# Patient Record
Sex: FEMALE | Race: WHITE | Employment: OTHER | URBAN - METROPOLITAN AREA
[De-identification: names, ages, dates, MRNs, and addresses within clinical notes are randomized per-mention and may not be internally consistent; named-entity substitution may affect disease eponyms.]

---

## 2017-07-21 ENCOUNTER — OFFICE VISIT (OUTPATIENT)
Dept: OBGYN CLINIC | Age: 82
End: 2017-07-21

## 2017-07-21 VITALS
HEIGHT: 64 IN | RESPIRATION RATE: 18 BRPM | BODY MASS INDEX: 31.51 KG/M2 | SYSTOLIC BLOOD PRESSURE: 110 MMHG | DIASTOLIC BLOOD PRESSURE: 68 MMHG | WEIGHT: 184.6 LBS

## 2017-07-21 DIAGNOSIS — N64.4 BREAST PAIN: Primary | ICD-10-CM

## 2017-07-21 DIAGNOSIS — Z87.898: ICD-10-CM

## 2017-07-21 DIAGNOSIS — R93.89 ABNORMAL CT OF THE CHEST: ICD-10-CM

## 2017-07-21 NOTE — MR AVS SNAPSHOT
Visit Information Date & Time Provider Department Dept. Phone Encounter #  
 7/21/2017 10:50 AM Lori Beckwith MD [facility] [phone] [id number] Upcoming Health Maintenance Date Due ZOSTER VACCINE AGE 60> 2/20/1997 OSTEOPOROSIS SCREENING (DEXA) 4/20/2002 INFLUENZA AGE 9 TO ADULT 8/1/2017 Allergies as of 7/21/2017  Review Complete On: 7/21/2017 By: Albino Ramírez Severity Noted Reaction Type Reactions Codeine  04/07/2011    Itching [address] [Nitrofurantoin Monohyd/m-cryst]  04/07/2011    Other (comments) Muscle weakness Pcn [Penicillins]  04/07/2011    Rash Itching, facial swelling, pounding heart Current Immunizations  Never Reviewed No immunizations on file. Not reviewed this visit Vitals BP Resp Height(growth percentile) Weight(growth percentile) BMI OB Status 110/68 (BP 1 Location: Left arm, BP Patient Position: Sitting) 18 5' 4\" (1.626 m) 184 lb 9.6 oz (83.7 kg) 31.69 kg/m2 Hysterectomy Smoking Status Former Smoker Vitals History BMI and BSA Data Body Mass Index Body Surface Area  
 31.69 kg/m 2 1.94 m 2 Preferred Pharmacy Pharmacy Name Phone [facility] [address], [address] [phone] Your Updated Medication List  
  
   
This list is accurate as of: 7/21/17 11:19 AM.  Always use your most recent med list.  
  
  
  
  
 ASPIRIN PO Take 81 mg by mouth daily. BUMEX PO Take  by mouth. Usually takes one mg po once a week. BYSTOLIC 5 mg tablet Generic drug:  nebivolol Take 5 mg by mouth nightly. CO Q-10 200 mg capsule Generic drug:  coenzyme Q-10 Take 200 mg by mouth daily. doxepin 150 mg capsule Commonly known as:  SINEquan Take 150 mg by mouth nightly. doxycycline 100 mg capsule Commonly known as:  Geraline Block Take  by mouth. Will take 4 caps one hour prior to colonoscopy and 4 caps one hour prior to colonoscopy on 10/27/2016. LORazepam 0.5 mg tablet Commonly known as:  ATIVAN Take 0.5 mg by mouth as needed for Anxiety. meloxicam 15 mg tablet Commonly known as:  MOBIC Take 15 mg by mouth daily. MULTIVITAMIN PO Take  by mouth daily. Takes one po daily. omeprazole 20 mg capsule Commonly known as:  PRILOSEC Take 20 mg by mouth as needed. PROBIOTIC PO Take  by mouth. Takes on po one daily. PROzac 40 mg capsule Generic drug:  FLUoxetine Take 40 mg by mouth daily. SYMBICORT 160-4.5 mcg/actuation HFA inhaler Generic drug:  budesonide-formoterol Take 2 Puffs by inhalation as needed. VITAMIN D3 PO Take 2,000 mg by mouth daily. Patient Instructions Breast Lumps: Care Instructions Your Care Instructions Breast lumps are common, especially in women between ages 27 and 48. Many womens breasts feel lumpy and tender before their menstrual period. Women also may have lumps when they are breastfeeding. Breast lumps may go away after menopause. All new breast lumps in women after menopause should be checked by a doctor. Although lumps may be normal for you, it is important to have your doctor check any lump or thickness that is not like the rest of your breast to make sure it is not cancer. A lump may be larger, harder, or different from the rest of your breast tissue. Follow-up care is a key part of your treatment and safety. Be sure to make and go to all appointments, and call your doctor if you are having problems. Its also a good idea to know your test results and keep a list of the medicines you take. How can you care for yourself at home? · Make an appointment to have a mammogram and other follow-up visits as recommended by your doctor. When should you call for help? Call your doctor now or seek immediate medical care if: · You have new changes in a breast, such as: ¨ A lump or thickening in your breast or armpit. ¨ A change in the breast's size or shape. ¨ Skin changes, such as dimples or puckers. ¨ Nipple discharge. ¨ A change in the color or feel of the skin of your breast or the darker area around the nipple (areola). ¨ A change in the shape of the nipple (it may look like it's being pulled into the breast). · You have symptoms of a breast infection, such as: 
¨ Increased pain, swelling, redness, or warmth around a breast. 
¨ Red streaks extending from the breast. 
¨ Pus draining from a breast. 
¨ A fever. Watch closely for changes in your health, and be sure to contact your doctor if: 
· You do not get better as expected. Where can you learn more? Go to http://herminio-los.info/. Enter G192 in the search box to learn more about \"Breast Lumps: Care Instructions. \" Current as of: October 13, 2016 Content Version: 11.3 © 1480-7408 [employer]. Care instructions adapted under license by [employer] (which disclaims liability or warranty for this information). If you have questions about a medical condition or this instruction, always ask your healthcare professional. [address] any warranty or liability for your use of this information. Introducing [facility]! Dear Ramya Brice: Thank you for requesting a Vecast account. Our records indicate that you already have an active Vecast account. You can access your account anytime at [email or web address] Did you know that you can access your hospital and ER discharge instructions at any time in Vecast? You can also review all of your test results from your hospital stay or ER visit. Additional Information If you have questions, please visit the Frequently Asked Questions section of the Vecast website at [email or web address] Remember, [employer] Systemshart is NOT to be used for urgent needs. For medical emergencies, dial 911. Now available from your iPhone and Android! Please provide this summary of care documentation to your next provider. Your primary care clinician is listed as Earl Prieto. If you have any questions after today's visit, please call [phone].

## 2017-07-21 NOTE — PATIENT INSTRUCTIONS
Breast Lumps: Care Instructions  Your Care Instructions  Breast lumps are common, especially in women between ages 27 and 48. Many womens breasts feel lumpy and tender before their menstrual period. Women also may have lumps when they are breastfeeding. Breast lumps may go away after menopause. All new breast lumps in women after menopause should be checked by a doctor. Although lumps may be normal for you, it is important to have your doctor check any lump or thickness that is not like the rest of your breast to make sure it is not cancer. A lump may be larger, harder, or different from the rest of your breast tissue. Follow-up care is a key part of your treatment and safety. Be sure to make and go to all appointments, and call your doctor if you are having problems. Its also a good idea to know your test results and keep a list of the medicines you take. How can you care for yourself at home? · Make an appointment to have a mammogram and other follow-up visits as recommended by your doctor. When should you call for help? Call your doctor now or seek immediate medical care if:  · You have new changes in a breast, such as:  ¨ A lump or thickening in your breast or armpit. ¨ A change in the breast's size or shape. ¨ Skin changes, such as dimples or puckers. ¨ Nipple discharge. ¨ A change in the color or feel of the skin of your breast or the darker area around the nipple (areola). ¨ A change in the shape of the nipple (it may look like it's being pulled into the breast). · You have symptoms of a breast infection, such as:  ¨ Increased pain, swelling, redness, or warmth around a breast.  ¨ Red streaks extending from the breast.  ¨ Pus draining from a breast.  ¨ A fever. Watch closely for changes in your health, and be sure to contact your doctor if:  · You do not get better as expected. Where can you learn more? Go to http://herminio-los.info/.   Enter V198 in the search box to learn more about \"Breast Lumps: Care Instructions. \"  Current as of: October 13, 2016  Content Version: 11.3  © 1883-9242 [employer] [place]. Care instructions adapted under license by [employer] (which disclaims liability or warranty for this information). If you have questions about a medical condition or this instruction, always ask your healthcare professional. [address] any warranty or liability for your use of this information.

## 2017-07-21 NOTE — PROGRESS NOTES
[address] [facility]  [email or web address]  [phone]    Kevin Ferrer MD, FACOG       OB/GYN Problem visit    Chief Complaint:   Chief Complaint   Patient presents with    Nodule     left breast       History of Present Illness: This is a new problem being evaluated by this provider. The patient is a [de-identified] y.o.  female who reports last week she went to the hospital because earlier that morning she had spit up some blood. Explains that the day before, she had yellow/brown tinged mucus. So she and her partner went to the hospital, and that's when she had an X-ray. She then found out she had a nodule on her thyroid gland and her left breast as well as her stomach. Denies breast pain and/or nipple discharge at this time. No aggravating or alleviating factors noted at this time. She reports the symptoms are is unchanged. Aggravating factors include none. Alleviating factors include none. She does not have other concerns. LMP: No LMP recorded. Patient has had a hysterectomy.     PFSH:  Past Medical History:   Diagnosis Date    Anxiety     Aortic valve disorders 2011    Arthritis     Asthma     off and on per pt    Chronic pain     both legs/had 2 ESIs in back    Coronary atherosclerosis of native coronary artery 2011    pt unsure of this    GERD (gastroesophageal reflux disease)     hiatal hernia    Heart failure ([address]     CHF    Hypertension     Left heart failure ([address] 2011    Other chest pain 2011    Other ill-defined conditions     fibromyalgia    Other ill-defined conditions     back pain    Unspecified sleep apnea     does not wear CPAP - minor     Past Surgical History:   Procedure Laterality Date    CARDIAC SURG PROCEDURE UNLIST      aortic valve replacement - cow valve    COLONOSCOPY N/A 10/27/2016    COLONOSCOPY performed by [facility]renice Ferrell MD at [facility] ENDOSCOPY    HX APPENDECTOMY      done with cholecystectomy    HX GI cholecystectomy    HX GYN      hysterectomy (left ovaries), menorrhagia    HX HEENT      tonsillectomy x 1 as of 10/26/2016    HX ORTHOPAEDIC      bilateral partial knee replacements    HX ORTHOPAEDIC      bilateral trigger finger release    HX OTHER SURGICAL      EGD/colonoscopy x 3 - colon polyp    HX TONSILLECTOMY      HX WISDOM TEETH EXTRACTION       Family History   Problem Relation Age of Onset    Colon Cancer Mother     Heart Failure Mother     Diabetes Father     Heart Disease Sister      pumping at 10%    Other Sister      aneurysm in heart    Diabetes Sister     Diabetes Daughter      Social History   Substance Use Topics    Smoking status: Former Smoker     Quit date: 8/15/1984    Smokeless tobacco: Never Used      Comment: former cigarette smoker    Alcohol use No     Allergies   Allergen Reactions    Codeine Itching    Macrobid [Nitrofurantoin Monohyd/M-Cryst] Other (comments)     Muscle weakness    Pcn [Penicillins] Rash     Itching, facial swelling, pounding heart     Current Outpatient Prescriptions   Medication Sig    coenzyme Q-10 (CO Q-10) 200 mg capsule Take 200 mg by mouth daily.  nebivolol (BYSTOLIC) 5 mg tablet Take 5 mg by mouth nightly.  ASPIRIN PO Take 81 mg by mouth daily.  LACTOBACILLUS RHAMNOSUS GG (PROBIOTIC PO) Take  by mouth. Takes on po one daily.  CHOLECALCIFEROL, VITAMIN D3, (VITAMIN D3 PO) Take 2,000 mg by mouth daily.  doxepin (SINEQUAN) 150 mg capsule Take 150 mg by mouth nightly.  MULTIVITAMIN PO Take  by mouth daily. Takes one po daily.  fluoxetine (PROZAC) 40 mg capsule Take 40 mg by mouth daily.  LORazepam (ATIVAN) 0.5 mg tablet Take 0.5 mg by mouth as needed for Anxiety.  doxycycline (MONODOX) 100 mg capsule Take  by mouth. Will take 4 caps one hour prior to colonoscopy and 4 caps one hour prior to colonoscopy on 10/27/2016.     budesonide-formoterol (SYMBICORT) 160-4.5 mcg/actuation HFA inhaler Take 2 Puffs by inhalation as needed.  meloxicam (MOBIC) 15 mg tablet Take 15 mg by mouth daily.  omeprazole (PRILOSEC) 20 mg capsule Take 20 mg by mouth as needed.  BUMETANIDE (BUMEX PO) Take  by mouth. Usually takes one mg po once a week. No current facility-administered medications for this visit. Review of Systems:  History obtained from the patient  Constitutional: negative for fevers, chills and weight loss  ENT ROS: negative for - hearing change, oral lesions or visual changes  Respiratory: negative for cough, wheezing or dyspnea on exertion  Cardiovascular: negative for chest pain, irregular heart beats, exertional chest pressure/discomfort  Gastrointestinal: negative for dysphagia, nausea and vomiting  Genito-Urinary ROS:  see HPI  Inteument/breast: negative for rash, breast lump and nipple discharge  Musculoskeletal:negative for stiff joints, neck pain and muscle weakness  Endocrine ROS: negative for - breast changes, galactorrhea or temperature intolerance  Hematological and Lymphatic ROS: negative for - blood clots, bruising or swollen lymph nodes    Physical Exam:  Visit Vitals    /68 (BP 1 Location: Left arm, BP Patient Position: Sitting)    Resp 18    Ht 5' 4\" (1.626 m)    Wt 184 lb 9.6 oz (83.7 kg)    BMI 31.69 kg/m2       GENERAL: alert, well appearing, and in no distress  HEAD: normocephalic, atraumatic. PULM: clear to auscultation, no wheezes, rales or rhonchi, symmetric air entry   Breast exam: breasts appear normal, no suspicious masses, no skin or nipple changes or axillary nodes. COR: normal rate and regular rhythm, S1 and S2 normal   ABDOMEN: soft, nontender, nondistended, no masses or organomegaly     NEURO: alert, oriented, normal speech    Assessment:  Encounter Diagnoses   Name Primary?     Breast pain Yes    Hx of lymphadenopathy     Abnormal CT of the chest     Comment: showed mass/LN subpectoral       Plan:  The patient is advised that she should contact the office if she does not note improvement or if symptoms recur  Recommend follow up with PCP for non-gynecologic complaints and chronic medical problems. She should contact our office with any questions or concerns  She could keep her routine annual exam appointment. Disc causes of breast pain and LN  Rec breast referral  Disc SBE  Reviewed outside CT: Left ax LAD and subpectoral LN 1.5 cm on left  Reviewed prior gyn US and CT: suspect ov cyst: needs fu  Rec keep endo fu for thyroid nodule: has appt  GYN US and fu planned: pt will make today, had to r/s past appt. US Results (most recent):    Results from [place] encounter on 02/04/16   US TRANSVAGINAL   Narrative **Final Report**      ICD Codes / Adm. Diagnosis: 625.9  R10.2 / Unspecified symptom associated    Pelvic and perineal pain  Examination:  US TRANSVAGINAL NON OB  - [id number] - Feb 4 2016  3:00PM  Accession No:  [id number]  Reason:  pain      REPORT:  See impression. IMPRESSION:      The final result for this exam can be located in this patient's chart with   results from [facility]. Signing/Reading Doctor: Jessica Pascual ([id number])    Matthew Weeks  ([id number])  Feb 4 2016  3:01PM                                  CT Results (most recent):    Results from Hospital Encounter encounter on 02/17/16   CT ABD PELV WO CONT   Narrative **Final Report**      ICD Codes / Adm. Diagnosis: 625.9  789.09 / Unspecified symptom associated    Abdominal pain, other specif  Examination:  CT ABD PELVIS WO CON  - [id number] - Feb 17 2016  1:22PM  Accession No:  [id number]  Reason:  pelvic pain, abd pain, pelvic mass/cyst      REPORT:  EXAM: CT ABDOMEN AND PELVIS WITHOUT CONTRAST    INDICATION:  Abdominal and pelvic pain with pelvic mass or cyst.    COMPARISON: None. CONTRAST: None. TECHNIQUE:   Unenhanced multislice helical CT was performed from the diaphragm to the   symphysis pubis without or venous contrast administration.  Oral contrast was given.  Contiguous 5 mm axial images were reconstructed and lung and soft   tissue windows were generated. Coronal and sagittal reformations were   generated. CT dose reduction was achieved through use of a standardized   protocol tailored for this examination and automatic exposure control for   dose modulation. FINDINGS:  The patient is morbidly obese. The patient is status post median sternotomy   and aortic valve replacement. LOWER CHEST: The visualized portions of the lung bases are clear. The absence of intravenous contrast material reduces the sensitivity for   evaluation of the solid parenchymal organs of the abdomen. ABDOMEN:  Liver: The liver is normal in size and contour with a small low-attenuation   lesion that is too small to characterize. Gallbladder and bile ducts: The gallbladder is absent. There is no   intrahepatic biliary duct dilatation. Spleen: No abnormality. Pancreas: No abnormality. Adrenal glands: No abnormality. Kidneys: No focal parenchymal abnormality. There is no renal or ureteral   calculus or obstruction. PELVIS:  Reproductive organs: The uterus is absent. There is a small 2.7 x 2.6 x 3.1   cm cyst in the pelvic cul-de-sac which may be ovarian related. The ovaries   are not clearly identified. Bladder: No abnormality. RETROPERITONEUM: The aorta is atherosclerotic and tapers without aneurysm. There is no retroperitoneal adenopathy or mass. There is no pelvic mass or   adenopathy    BOWEL AND MESENTERY: The small bowel is normal. The appendix is absent. There are diverticula of the sigmoid colon without evidence of acute   diverticulitis. PERITONEUM: There is no ascites or free intraperitoneal air. BONES AND SOFT TISSUES: There is degenerative disc disease of the lumbar   spine. IMPRESSION:   1. No acute abdominal or pelvic abnormality. 2. Status post hysterectomy.   3. Small pelvic cul-de-sac cyst possibly ovarian. No pelvic mass or   adenopathy. 4. Status post median sternotomy and aortic valve replacement. 5. Atherosclerotic abdominal aorta without aneurysm. 6. Status post cholecystectomy. 7. Status post appendectomy. 8. Diverticulosis. 9. Lumbar spondylosis. Signing/Reading Doctor: Jasmyne Sanchez ([id number])    Approved: Jasmyne Sanchez ([id number])  Feb 17 2016  1:36PM                                     Orders Placed This Encounter    REFERRAL TO BREAST SURGERY       No results found for this visit on 07/21/17.

## 2017-07-28 DIAGNOSIS — N63.0 BREAST MASS: Primary | ICD-10-CM

## 2017-08-01 ENCOUNTER — HOSPITAL ENCOUNTER (OUTPATIENT)
Dept: MAMMOGRAPHY | Age: 82
Discharge: HOME | End: 2017-08-01
Attending: SURGERY
Payer: MEDICARE

## 2017-08-01 DIAGNOSIS — Z12.31 ENCOUNTER FOR SCREENING MAMMOGRAM FOR BREAST CANCER: ICD-10-CM

## 2017-08-01 DIAGNOSIS — N63.0 BREAST MASS: ICD-10-CM

## 2017-08-01 PROCEDURE — 77067 SCR MAMMO BI INCL CAD: CPT

## 2017-08-09 ENCOUNTER — OFFICE VISIT (OUTPATIENT)
Dept: OBGYN CLINIC | Age: 82
End: 2017-08-09

## 2017-08-09 VITALS
BODY MASS INDEX: 31.62 KG/M2 | SYSTOLIC BLOOD PRESSURE: 118 MMHG | HEIGHT: 64 IN | WEIGHT: 185.2 LBS | RESPIRATION RATE: 18 BRPM | DIASTOLIC BLOOD PRESSURE: 64 MMHG

## 2017-08-09 DIAGNOSIS — N94.9 ADNEXAL CYST: Primary | ICD-10-CM

## 2017-08-09 RX ORDER — CLINDAMYCIN HYDROCHLORIDE 300 MG/1
CAPSULE ORAL
COMMUNITY
Start: 2017-08-02 | End: 2018-03-26

## 2017-08-09 RX ORDER — SPIRONOLACTONE 25 MG/1
25 TABLET ORAL DAILY
COMMUNITY
Start: 2017-08-02

## 2017-08-09 NOTE — MR AVS SNAPSHOT
Visit Information Date & Time Provider Department Dept. Phone Encounter #  
 8/9/2017  1:50 PM Howard Peguero MD Jesus Morales [phone] [id number] Upcoming Health Maintenance Date Due ZOSTER VACCINE AGE 60> 2/20/1997 OSTEOPOROSIS SCREENING (DEXA) 4/20/2002 INFLUENZA AGE 9 TO ADULT 8/1/2017 Allergies as of 8/9/2017  Review Complete On: 8/9/2017 By: Pool Lim Severity Noted Reaction Type Reactions Codeine  04/07/2011    Itching Ratna Lacer [Nitrofurantoin Monohyd/m-cryst]  04/07/2011    Other (comments) Muscle weakness Pcn [Penicillins]  04/07/2011    Rash Itching, facial swelling, pounding heart Current Immunizations  Never Reviewed No immunizations on file. Not reviewed this visit Vitals BP Resp Height(growth percentile) Weight(growth percentile) BMI OB Status 118/64 (BP 1 Location: Left arm, BP Patient Position: Sitting) 18 5' 4\" (1.626 m) 185 lb 3.2 oz (84 kg) 31.79 kg/m2 Hysterectomy Smoking Status Former Smoker BMI and BSA Data Body Mass Index Body Surface Area 31.79 kg/m 2 1.95 m 2 Preferred Pharmacy Pharmacy Name Phone [address] [facility] [address] [place] [address] Drive, [address] [facility] [address] [phone] Your Updated Medication List  
  
   
This list is accurate as of: 8/9/17  1:58 PM.  Always use your most recent med list.  
  
  
  
  
 ASPIRIN PO Take 81 mg by mouth daily. BUMEX PO Take  by mouth. Usually takes one mg po once a week. BYSTOLIC 5 mg tablet Generic drug:  nebivolol Take 5 mg by mouth nightly. clindamycin 300 mg capsule Commonly known as:  CLEOCIN  
  
 CO Q-10 200 mg capsule Generic drug:  coenzyme Q-10 Take 200 mg by mouth daily. doxepin 150 mg capsule Commonly known as:  SINEquan Take 150 mg by mouth nightly. doxycycline 100 mg capsule Commonly known as:  Jimi Reyes Take  by mouth. Will take 4 caps one hour prior to colonoscopy and 4 caps one hour prior to colonoscopy on 10/27/2016. LORazepam 0.5 mg tablet Commonly known as:  ATIVAN Take 0.5 mg by mouth as needed for Anxiety. meloxicam 15 mg tablet Commonly known as:  MOBIC Take 15 mg by mouth daily. MULTIVITAMIN PO Take  by mouth daily. Takes one po daily. omeprazole 20 mg capsule Commonly known as:  PRILOSEC Take 20 mg by mouth as needed. PROBIOTIC PO Take  by mouth. Takes on po one daily. PROzac 40 mg capsule Generic drug:  FLUoxetine Take 40 mg by mouth daily. spironolactone 25 mg tablet Commonly known as:  ALDACTONE  
  
 SYMBICORT 160-4.5 mcg/actuation HFA inhaler Generic drug:  budesonide-formoterol Take 2 Puffs by inhalation as needed. VITAMIN D3 PO Take 2,000 mg by mouth daily. Patient Instructions Breast Self-Exam: Care Instructions Your Care Instructions A breast self-exam is when you check your breasts for lumps or changes. This regular exam helps you learn how your breasts normally look and feel. Most breast problems or changes are not because of cancer. Breast self-exam is not a substitute for a mammogram. Having regular breast exams by your doctor and regular mammograms improve your chances of finding any problems with your breasts. Some women set a time each month to do a step-by-step breast self-exam. Other women like a less formal system. They might look at their breasts as they brush their teeth, or feel their breasts once in a while in the shower. If you notice a change in your breast, tell your doctor. Follow-up care is a key part of your treatment and safety. Be sure to make and go to all appointments, and call your doctor if you are having problems. Its also a good idea to know your test results and keep a list of the medicines you take.  
How do you do a breast self-exam? 
 · The best time to examine your breasts is usually one week after your menstrual period begins. Your breasts should not be tender then. If you do not have periods, you might do your exam on a day of the month that is easy to remember. · To examine your breasts: ¨ Remove all your clothes above the waist and lie down. When you are lying down, your breast tissue spreads evenly over your chest wall, which makes it easier to feel all your breast tissue. ¨ Use the padsnot the fingertipsof the 3 middle fingers of your left hand to check your right breast. Move your fingers slowly in small coin-sized circles that overlap. ¨ Use three levels of pressure to feel of all your breast tissue. Use light pressure to feel the tissue close to the skin surface. Use medium pressure to feel a little deeper. Use firm pressure to feel your tissue close to your breastbone and ribs. Use each pressure level to feel your breast tissue before moving on to the next spot. ¨ Check your entire breast, moving up and down as if following a strip from the collarbone to the bra line, and from the armpit to the ribs. Repeat until you have covered the entire breast. 
¨ Repeat this procedure for your left breast, using the pads of the 3 middle fingers of your right hand. · To examine your breasts while in the shower: 
¨ Place one arm over your head and lightly soap your breast on that side. ¨ Using the pads of your fingers, gently move your hand over your breast (in the strip pattern described above), feeling carefully for any lumps or changes. ¨ Repeat for the other breast. 
· Have your doctor inspect anything you notice to see if you need further testing. Where can you learn more? Go to http://herminio-los.info/. Enter P148 in the search box to learn more about \"Breast Self-Exam: Care Instructions. \" Current as of: July 26, 2016 Content Version: 11.3 © 6361-8188 Healthwise, Incorporated. Care instructions adapted under license by test company (which disclaims liability or warranty for this information). If you have questions about a medical condition or this instruction, always ask your healthcare professional. [address] any warranty or liability for your use of this information. Introducing [facility]! Dear Chelsea Fontanez: Thank you for requesting a Cardley account. Our records indicate that you already have an active Cardley account. You can access your account anytime at [email or web address] Siving Egil [email or web address] Did you know that you can access your hospital and ER discharge instructions at any time in Cardley? You can also review all of your test results from your hospital stay or ER visit. Additional Information If you have questions, please visit the Frequently Asked Questions section of the Cardley website at [email or web address] Remember, Cardley is NOT to be used for urgent needs. For medical emergencies, dial 911. Now available from your iPhone and Android! Please provide this summary of care documentation to your next provider. Your primary care clinician is listed as Earl Prieto. If you have any questions after today's visit, please call [phone].

## 2017-08-09 NOTE — PROGRESS NOTES
[facility]  [email or web address]    Sp Tovar MD, [address] [facility]       OB/GYN Follow-up visit    Chief Complaint: Follow up visit  Chief Complaint   Patient presents with    Ultrasound     Ovarian cyst       History of Present Illness: This is a follow up visit from 07/21/2017. She is having a follow up for ultrasound to check ovarian cyst.  She had cyst of ? Origin on an CT. The patient denies any new problems or changes at this time. She reports the symptoms are has improved. Aggravating factors include none. Alleviating factors include none. She does not have other concerns. LMP: No LMP recorded. Patient has had a hysterectomy.     PFSH:  Past Medical History:   Diagnosis Date    Anxiety     Aortic valve disorders 4/7/2011    Arthritis     Asthma     off and on per pt    Chronic pain     both legs/had 2 ESIs in back    Coronary atherosclerosis of native coronary artery 4/7/2011    pt unsure of this    GERD (gastroesophageal reflux disease)     hiatal hernia    Heart failure ([address]     CHF    Hypertension     Left heart failure ([address] 4/7/2011    Other chest pain 4/7/2011    Other ill-defined conditions     fibromyalgia    Other ill-defined conditions     back pain    Unspecified sleep apnea     does not wear CPAP - minor     Past Surgical History:   Procedure Laterality Date    CARDIAC SURG PROCEDURE UNLIST  2013    aortic valve replacement - cow valve    COLONOSCOPY N/A 10/27/2016    COLONOS[facility] performed by Peace Tafoya [facility] at [facility] END[facility]    HX APPENDECTOMY      done with cholecystectomy    HX GI      cholecystectomy    HX GYN      hysterectomy (left ovaries), menorrhagia    HX HEENT      tonsillectomy x 1 as of 10/26/2016    HX ORTHOPAEDIC      bilateral partial knee replacements    HX ORTHOPAEDIC      bilateral trigger finger release    HX OTHER SURGICAL      EGD/colonoscopy x 3 - colon polyp    HX TONSILLECTOMY      HX WISDOM TEETH EXTRACTION      IMPLANT BREAST SILICONE/EQ Bilateral     Removed are 3 different sets. Family History   Problem Relation Age of Onset    Colon Cancer Mother     Heart Failure Mother     Diabetes Father     Heart Disease Sister      pumping at 10%    Other Sister      aneurysm in heart    Diabetes Sister     Diabetes Daughter      Social History   Substance Use Topics    Smoking status: Former Smoker     Quit date: 8/15/1984    Smokeless tobacco: Never Used      Comment: former cigarette smoker    Alcohol use No     Allergies   Allergen Reactions    Codeine Itching    Macrobid [Nitrofurantoin Monohyd/M-Cryst] Other (comments)     Muscle weakness    Pcn [Penicillins] Rash     Itching, facial swelling, pounding heart     Current Outpatient Prescriptions   Medication Sig    clindamycin (CLEOCIN) 300 mg capsule     spironolactone (ALDACTONE) 25 mg tablet     coenzyme Q-10 (CO Q-10) 200 mg capsule Take 200 mg by mouth daily.  nebivolol (BYSTOLIC) 5 mg tablet Take 5 mg by mouth nightly.  ASPIRIN PO Take 81 mg by mouth daily.  meloxicam (MOBIC) 15 mg tablet Take 15 mg by mouth daily.  LACTOBACILLUS RHAMNOSUS GG (PROBIOTIC PO) Take  by mouth. Takes on po one daily.  CHOLECALCIFEROL, VITAMIN D3, (VITAMIN D3 PO) Take 2,000 mg by mouth daily.  doxepin (SINEQUAN) 150 mg capsule Take 150 mg by mouth nightly.  MULTIVITAMIN PO Take  by mouth daily. Takes one po daily.  fluoxetine (PROZAC) 40 mg capsule Take 40 mg by mouth daily.  omeprazole (PRILOSEC) 20 mg capsule Take 20 mg by mouth as needed. No current facility-administered medications for this visit.         Review of Systems:  History obtained from the patient  Constitutional: negative for fevers, chills and weight loss  ENT ROS: negative for - hearing change, oral lesions or visual changes  Respiratory: negative for cough, wheezing or dyspnea on exertion  Cardiovascular: negative for chest pain, irregular heart beats, exertional chest pressure/discomfort  Gastrointestinal: negative for dysphagia, nausea and vomiting  Genito-Urinary ROS: no dysuria, trouble voiding, or hematuria  Inteument/breast: negative for rash, breast lump and nipple discharge  Musculoskeletal:negative for stiff joints, neck pain and muscle weakness  Endocrine ROS: negative for - breast changes, galactorrhea or temperature intolerance  Hematological and Lymphatic ROS: negative for - blood clots, bruising or swollen lymph nodes    Physical Exam:  Visit Vitals    /64 (BP 1 Location: Left arm, BP Patient Position: Sitting)    Resp 18    Ht 5' 4\" (1.626 m)    Wt 185 lb 3.2 oz (84 kg)    BMI 31.79 kg/m2       GENERAL: alert, well appearing, and in no distress  Skin exam - normal coloration and turgor, no rashes, no suspicious skin lesions noted. Pt declined pelvic  NEURO: alert, oriented, normal speech    Assessment:  Encounter Diagnosis   Name Primary?  Adnexal cyst Yes       Plan:  The patient is advised that she should contact the office with any questions or concerns. She should make her routine annual gynecologic appointment if needed. Disc possible causes of pelvic cysts: peritoneal, fluid build up, resolved cyst  Suspect benign/resolved  Reviewed s/sx ov cancer: fu if has any questions or concerns. Ct reviewed 2/17/06  PELVIS:  Reproductive organs: The uterus is absent. There is a small 2.7 x 2.6 x 3.1   cm cyst in the pelvic cul-de-sac which may be ovarian related. The ovaries   are not clearly identified. No orders of the defined types were placed in this encounter. No results found for this visit on 08/09/17. Marita Habermann, MD    UTERUS IS SURGICALLY ABSENT. RIGHT ADNEXA APPEARS WITHIN NORMAL LIMITS. LEFT ADNEXA APPEARS WITHIN NORMAL LIMITS. NO MASSES OR CYSTS ARE SEEN TODAY. NO FREE FLUID SEEN IN THE CDS.     Physician review of ultrasound performed by technician    Today's ultrasound report and images were reviewed and discussed with the patient.   Please see images and imaging report entered by technician in PACS for more detail and progress note and diagnosis entered by MD.    Alley Tracey MD

## 2017-08-09 NOTE — PATIENT INSTRUCTIONS
Breast Self-Exam: Care Instructions  Your Care Instructions  A breast self-exam is when you check your breasts for lumps or changes. This regular exam helps you learn how your breasts normally look and feel. Most breast problems or changes are not because of cancer. Breast self-exam is not a substitute for a mammogram. Having regular breast exams by your doctor and regular mammograms improve your chances of finding any problems with your breasts. Some women set a time each month to do a step-by-step breast self-exam. Other women like a less formal system. They might look at their breasts as they brush their teeth, or feel their breasts once in a while in the shower. If you notice a change in your breast, tell your doctor. Follow-up care is a key part of your treatment and safety. Be sure to make and go to all appointments, and call your doctor if you are having problems. Its also a good idea to know your test results and keep a list of the medicines you take. How do you do a breast self-exam?  · The best time to examine your breasts is usually one week after your menstrual period begins. Your breasts should not be tender then. If you do not have periods, you might do your exam on a day of the month that is easy to remember. · To examine your breasts:  ¨ Remove all your clothes above the waist and lie down. When you are lying down, your breast tissue spreads evenly over your chest wall, which makes it easier to feel all your breast tissue. ¨ Use the pads--not the fingertips--of the 3 middle fingers of your left hand to check your right breast. Move your fingers slowly in small coin-sized circles that overlap. ¨ Use three levels of pressure to feel of all your breast tissue. Use light pressure to feel the tissue close to the skin surface. Use medium pressure to feel a little deeper. Use firm pressure to feel your tissue close to your breastbone and ribs.  Use each pressure level to feel your breast tissue before moving on to the next spot. ¨ Check your entire breast, moving up and down as if following a strip from the collarbone to the bra line, and from the armpit to the ribs. Repeat until you have covered the entire breast.  ¨ Repeat this procedure for your left breast, using the pads of the 3 middle fingers of your right hand. · To examine your breasts while in the shower:  ¨ Place one arm over your head and lightly soap your breast on that side. ¨ Using the pads of your fingers, gently move your hand over your breast (in the strip pattern described above), feeling carefully for any lumps or changes. ¨ Repeat for the other breast.  · Have your doctor inspect anything you notice to see if you need further testing. Where can you learn more? Go to http://herminio-los.info/. Enter P148 in the search box to learn more about \"Breast Self-Exam: Care Instructions. \"  Current as of: July 26, 2016  Content Version: 11.3  © 1771-5471 [employer], Incorporated. Care instructions adapted under license by [employer] (which disclaims liability or warranty for this information). If you have questions about a medical condition or this instruction, always ask your healthcare professional. [place] [address] any warranty or liability for your use of this information.

## 2018-03-26 RX ORDER — CLONAZEPAM 0.5 MG/1
0.5 TABLET ORAL
COMMUNITY
End: 2020-05-23

## 2018-03-26 RX ORDER — GABAPENTIN 100 MG/1
100 CAPSULE ORAL 2 TIMES DAILY
COMMUNITY
End: 2020-02-13

## 2018-03-26 RX ORDER — OMEPRAZOLE 40 MG/1
40 CAPSULE, DELAYED RELEASE ORAL
COMMUNITY
End: 2020-02-13

## 2018-03-26 RX ORDER — GLIPIZIDE 5 MG/1
5 TABLET ORAL DAILY
COMMUNITY

## 2018-03-27 ENCOUNTER — HOSPITAL ENCOUNTER (OUTPATIENT)
Age: 83
Setting detail: OUTPATIENT SURGERY
Discharge: HOME | End: 2018-03-27
Attending: INTERNAL MEDICINE | Admitting: INTERNAL MEDICINE
Payer: MEDICARE

## 2018-03-27 ENCOUNTER — ANESTHESIA (OUTPATIENT)
Dept: ENDOSCOPY | Age: 83
End: 2018-03-27
Payer: MEDICARE

## 2018-03-27 ENCOUNTER — ANESTHESIA EVENT (OUTPATIENT)
Dept: ENDOSCOPY | Age: 83
End: 2018-03-27
Payer: MEDICARE

## 2018-03-27 VITALS
RESPIRATION RATE: 28 BRPM | WEIGHT: 185 LBS | TEMPERATURE: 97.8 F | OXYGEN SATURATION: 97 % | SYSTOLIC BLOOD PRESSURE: 120 MMHG | BODY MASS INDEX: 31.76 KG/M2 | DIASTOLIC BLOOD PRESSURE: 71 MMHG | HEART RATE: 70 BPM

## 2018-03-27 LAB
H PYLORI FROM TISSUE: NEGATIVE
KIT LOT NO., HCLOLOT: NORMAL
NEGATIVE CONTROL: NEGATIVE
POSITIVE CONTROL: POSITIVE

## 2018-03-27 PROCEDURE — 76060000031 HC ANESTHESIA FIRST 0.5 HR: Performed by: INTERNAL MEDICINE

## 2018-03-27 PROCEDURE — 77030009426 HC FCPS BIOP ENDOSC BSC -B: Performed by: INTERNAL MEDICINE

## 2018-03-27 PROCEDURE — 87077 CULTURE AEROBIC IDENTIFY: CPT | Performed by: INTERNAL MEDICINE

## 2018-03-27 PROCEDURE — 76040000019: Performed by: INTERNAL MEDICINE

## 2018-03-27 PROCEDURE — 74011000250 HC RX REV CODE- 250

## 2018-03-27 PROCEDURE — 74011250636 HC RX REV CODE- 250/636

## 2018-03-27 RX ORDER — FENTANYL CITRATE 50 UG/ML
200 INJECTION, SOLUTION INTRAMUSCULAR; INTRAVENOUS
Status: DISCONTINUED | OUTPATIENT
Start: 2018-03-27 | End: 2018-03-27 | Stop reason: HOSPADM

## 2018-03-27 RX ORDER — SODIUM CHLORIDE 9 MG/ML
INJECTION, SOLUTION INTRAVENOUS
Status: DISCONTINUED | OUTPATIENT
Start: 2018-03-27 | End: 2018-03-27 | Stop reason: HOSPADM

## 2018-03-27 RX ORDER — EPINEPHRINE 0.1 MG/ML
1 INJECTION INTRACARDIAC; INTRAVENOUS
Status: DISCONTINUED | OUTPATIENT
Start: 2018-03-27 | End: 2018-03-27 | Stop reason: HOSPADM

## 2018-03-27 RX ORDER — NALOXONE HYDROCHLORIDE 0.4 MG/ML
0.4 INJECTION, SOLUTION INTRAMUSCULAR; INTRAVENOUS; SUBCUTANEOUS
Status: DISCONTINUED | OUTPATIENT
Start: 2018-03-27 | End: 2018-03-27 | Stop reason: HOSPADM

## 2018-03-27 RX ORDER — PROPOFOL 10 MG/ML
INJECTION, EMULSION INTRAVENOUS AS NEEDED
Status: DISCONTINUED | OUTPATIENT
Start: 2018-03-27 | End: 2018-03-27 | Stop reason: HOSPADM

## 2018-03-27 RX ORDER — LIDOCAINE HYDROCHLORIDE 20 MG/ML
INJECTION, SOLUTION EPIDURAL; INFILTRATION; INTRACAUDAL; PERINEURAL AS NEEDED
Status: DISCONTINUED | OUTPATIENT
Start: 2018-03-27 | End: 2018-03-27 | Stop reason: HOSPADM

## 2018-03-27 RX ORDER — SODIUM CHLORIDE 0.9 % (FLUSH) 0.9 %
5-10 SYRINGE (ML) INJECTION AS NEEDED
Status: DISCONTINUED | OUTPATIENT
Start: 2018-03-27 | End: 2018-03-27 | Stop reason: HOSPADM

## 2018-03-27 RX ORDER — DEXTROMETHORPHAN/PSEUDOEPHED 2.5-7.5/.8
1.2 DROPS ORAL
Status: DISCONTINUED | OUTPATIENT
Start: 2018-03-27 | End: 2018-03-27 | Stop reason: HOSPADM

## 2018-03-27 RX ORDER — SODIUM CHLORIDE 9 MG/ML
50 INJECTION, SOLUTION INTRAVENOUS CONTINUOUS
Status: DISCONTINUED | OUTPATIENT
Start: 2018-03-27 | End: 2018-03-27 | Stop reason: HOSPADM

## 2018-03-27 RX ORDER — GLYCOPYRROLATE 0.2 MG/ML
INJECTION INTRAMUSCULAR; INTRAVENOUS AS NEEDED
Status: DISCONTINUED | OUTPATIENT
Start: 2018-03-27 | End: 2018-03-27 | Stop reason: HOSPADM

## 2018-03-27 RX ORDER — FLUMAZENIL 0.1 MG/ML
0.2 INJECTION INTRAVENOUS
Status: DISCONTINUED | OUTPATIENT
Start: 2018-03-27 | End: 2018-03-27 | Stop reason: HOSPADM

## 2018-03-27 RX ORDER — SODIUM CHLORIDE 0.9 % (FLUSH) 0.9 %
5-10 SYRINGE (ML) INJECTION EVERY 8 HOURS
Status: DISCONTINUED | OUTPATIENT
Start: 2018-03-27 | End: 2018-03-27 | Stop reason: HOSPADM

## 2018-03-27 RX ORDER — ATROPINE SULFATE 0.1 MG/ML
0.5 INJECTION INTRAVENOUS
Status: DISCONTINUED | OUTPATIENT
Start: 2018-03-27 | End: 2018-03-27 | Stop reason: HOSPADM

## 2018-03-27 RX ORDER — MIDAZOLAM HYDROCHLORIDE 1 MG/ML
.25-1 INJECTION, SOLUTION INTRAMUSCULAR; INTRAVENOUS
Status: DISCONTINUED | OUTPATIENT
Start: 2018-03-27 | End: 2018-03-27 | Stop reason: HOSPADM

## 2018-03-27 RX ADMIN — SODIUM CHLORIDE: 9 INJECTION, SOLUTION INTRAVENOUS at 07:57

## 2018-03-27 RX ADMIN — PROPOFOL 50 MG: 10 INJECTION, EMULSION INTRAVENOUS at 08:00

## 2018-03-27 RX ADMIN — PROPOFOL 50 MG: 10 INJECTION, EMULSION INTRAVENOUS at 08:12

## 2018-03-27 RX ADMIN — GLYCOPYRROLATE 0.2 MG: 0.2 INJECTION INTRAMUSCULAR; INTRAVENOUS at 08:00

## 2018-03-27 RX ADMIN — PROPOFOL 50 MG: 10 INJECTION, EMULSION INTRAVENOUS at 08:05

## 2018-03-27 RX ADMIN — LIDOCAINE HYDROCHLORIDE 40 MG: 20 INJECTION, SOLUTION EPIDURAL; INFILTRATION; INTRACAUDAL; PERINEURAL at 08:00

## 2018-03-27 NOTE — ANESTHESIA PREPROCEDURE EVALUATION
Anesthetic History   No history of anesthetic complications            Review of Systems / Medical History  Patient summary reviewed, nursing notes reviewed and pertinent labs reviewed    Pulmonary            Asthma        Neuro/Psych   Within defined limits           Cardiovascular    Hypertension  Valvular problems/murmurs        CAD         GI/Hepatic/Renal     GERD           Endo/Other        Arthritis     Other Findings   Comments: fibromyalgia           Physical Exam    Airway  Mallampati: II  TM Distance: > 6 cm  Neck ROM: normal range of motion   Mouth opening: Normal     Cardiovascular  Regular rate and rhythm,  S1 and S2 normal,  no murmur, click, rub, or gallop             Dental  No notable dental hx       Pulmonary  Breath sounds clear to auscultation               Abdominal  GI exam deferred       Other Findings            Anesthetic Plan    ASA: 3  Anesthesia type: MAC          Induction: Intravenous  Anesthetic plan and risks discussed with: Patient

## 2018-03-27 NOTE — PROCEDURES
[address]     Endoscopic Gastroduodenoscopy Procedure Note    Francis Pugh  1935  [id number]    Procedure: Endoscopic Gastroduodenoscopy Esophageal dilatation done with Abae Glen Dilator diagnostic    Indication: Dysphagia     Pre-operative Diagnosis: see indication above    Post-operative Diagnosis: see findings below    : Coni Stevens MD    Referring Provider:  Coni Stevens MD      Anesthesia/Sedation:  MAC anesthesia Propofol    Airway assessment: No airway problems anticipated    Procedure Details     After infomed consent was obtained for the procedure, with all risks and benefits of procedure explained the patient was taken to the endoscopy suite and placed in the left lateral decubitus position. Following sequential administration of sedation as per above, the endoscope was inserted into the mouth and advanced under direct vision to second portion of the duodenum. A careful inspection was made as the gastroscope was withdrawn, including a retroflexed view of the proximal stomach; findings and interventions are described below. Findings:   Esophagus:Hiatus hernia  Stomach: Gastritis  Duodenum/jejunum: normal      Therapies: Esophageal dilatation done with Jenkins dilator No 56  Specimens: Gastric and duodenal biopsies taken. EBL:  None. Complications:   None; patient tolerated the procedure well. Impression:    -Hiatus hernia. Gastritis. Possibility of Esophageal Spasm    Pt. was Alert. Left the endoscopy suite in good general condition. Recommendations:  -Follow up with me.     Coni Stevens MD

## 2018-03-27 NOTE — IP AVS SNAPSHOT
2700 [facility] [address] [place] [address] 
[phone] Patient: Wie Davis MRN: [id number] CDQ:6/42/1415 About your hospitalization You were admitted on:  March 27, 2018 You last received care in the:  [facility] ENDOSCOPY You were discharged on:  March 27, 2018 Why you were hospitalized Your primary diagnosis was:  Not on File Follow-up Information Follow up With Details Comments Contact Info Milla Kitchen MD   5855 [facility] [address] [place] [address] 
[phone] Discharge Orders None A check luís indicates which time of day the medication should be taken. My Medications CONTINUE taking these medications Instructions Each Dose to Equal  
 Morning Noon Evening Bedtime ASPIRIN PO Your last dose was: Your next dose is: Take 81 mg by mouth daily. 81 mg  
    
   
   
   
  
 clonazePAM 0.5 mg tablet Commonly known as:  Fauzia Chairez Your last dose was: Your next dose is: Take 0.5 mg by mouth nightly as needed. 0.5 mg  
    
   
   
   
  
 doxepin 150 mg capsule Commonly known as:  SINEquan Your last dose was: Your next dose is: Take 150 mg by mouth nightly. 150 mg  
    
   
   
   
  
 gabapentin 100 mg capsule Commonly known as:  NEURONTIN Your last dose was: Your next dose is: Take 100 mg by mouth two (2) times a day. 100 mg  
    
   
   
   
  
 glipiZIDE 5 mg tablet Commonly known as:  Delphia Tarikwin Your last dose was: Your next dose is: Take 5 mg by mouth daily. 5 mg  
    
   
   
   
  
 meloxicam 15 mg tablet Commonly known as:  MOBIC Your last dose was: Your next dose is: Take 15 mg by mouth daily. 15 mg MULTIVITAMIN PO Your last dose was: Your next dose is: Take  by mouth daily. Takes one po daily. For women over 50.  
     
   
   
   
  
 omeprazole 40 mg capsule Commonly known as:  PRILOSEC Your last dose was: Your next dose is: Take 40 mg by mouth daily as needed. 40 mg PROzac 40 mg capsule Generic drug:  FLUoxetine Your last dose was: Your next dose is: Take 40 mg by mouth daily. 40 mg  
    
   
   
   
  
 spironolactone 25 mg tablet Commonly known as:  ALDACTONE Your last dose was: Your next dose is: Take 25 mg by mouth daily. 25 mg Opioid Education Prescription Opioids: What You Need to Know: 
 
Prescription opioids can be used to help relieve moderate-to-severe pain and are often prescribed following a surgery or injury, or for certain health conditions. These medications can be an important part of treatment but also come with serious risks. Opioids are strong pain medicines. Examples include hydrocodone, oxycodone, fentanyl, and morphine. Heroin is an example of an illegal opioid. It is important to work with your health care provider to make sure you are getting the safest, most effective care. WHAT ARE THE RISKS AND SIDE EFFECTS OF OPIOID USE? Prescription opioids carry serious risks of addiction and overdose, especially with prolonged use. An opioid overdose, often marked by slow breathing, can cause sudden death. The use of prescription opioids can have a number of side effects as well, even when taken as directed. · Tolerance-meaning you might need to take more of a medication for the same pain relief · Physical dependence-meaning you have symptoms of withdrawal when the medication is stopped. Withdrawal symptoms can include nausea, sweating, chills, diarrhea, stomach cramps, and muscle aches.   Withdrawal can last up to several weeks, depending on which drug you took and how long you took it. · Increased sensitivity to pain · Constipation · Nausea, vomiting, and dry mouth · Sleepiness and dizziness · Confusion · Depression · Low levels of testosterone that can result in lower sex drive, energy, and strength · Itching and sweating RISKS ARE GREATER WITH:      
· History of drug misuse, substance use disorder, or overdose · Mental health conditions (such as depression or anxiety) · Sleep apnea · Older age (72 years or older) · Pregnancy Avoid alcohol while taking prescription opioids. Also, unless specifically advised by your health care provider, medications to avoid include: · Benzodiazepines (such as Xanax or Valium) · Muscle relaxants (such as Soma or Flexeril) · Hypnotics (such as Ambien or Lunesta) · Other prescription opioids KNOW YOUR OPTIONS Talk to your health care provider about ways to manage your pain that don't involve prescription opioids. Some of these options may actually work better and have fewer risks and side effects. Options may include: 
· Pain relievers such as acetaminophen, ibuprofen, and naproxen · Some medications that are also used for depression or seizures · Physical therapy and exercise · Counseling to help patients learn how to cope better with triggers of pain and stress. · Application of heat or cold compress · Massage therapy · Relaxation techniques Be Informed Make sure you know the name of your medication, how much and how often to take it, and its potential risks & side effects. IF YOU ARE PRESCRIBED OPIOIDS FOR PAIN: 
· Never take opioids in greater amounts or more often than prescribed. Remember the goal is not to be pain-free but to manage your pain at a tolerable level. · Follow up with your primary care provider to: · Work together to create a plan on how to manage your pain. · Talk about ways to help manage your pain that don't involve prescription opioids. · Talk about any and all concerns and side effects. · Help prevent misuse and abuse. · Never sell or share prescription opioids · Help prevent misuse and abuse. · Store prescription opioids in a secure place and out of reach of others (this may include visitors, children, friends, and family). · Safely dispose of unused/unwanted prescription opioids: Find your community drug take-back program or your pharmacy mail-back program, or flush them down the toilet, following guidance from the Food and Drug Administration (www.fda.gov/Drugs/ResourcesForYou). · Visit www.cdc.gov/drugoverdose to learn about the risks of opioid abuse and overdose. · If you believe you may be struggling with addiction, tell your health care provider and ask for guidance or call [facility] [employer] at 7-706-148-EMQV. Discharge Instructions Dr Patricia Sandhu [facility] [address] [facility], [address] 
[phone] [employer] [id number] 
1935 EGD DISCHARGE INSTRUCTIONS Discomfort: 
Sore throat- throat lozenges or warm salt water gargle 
redness at IV site- apply warm compress to area; if redness or soreness persist- contact your physician Gaseous discomfort- walking, belching will help relieve any discomfort You may not operate a vehicle for 12 hours You may not engage in an occupation involving machinery or appliances for rest of today You may not drink alcoholic beverages for at least 12 hours Avoid making any critical decisions for at least 24 hour DIET You may have anything by mouth- do not eat or drink for two hours. You may eat and drink after you leave. You may resume your regular diet  however -  remember your colon is empty and a heavy meal will produce gas. Avoid these foods:  vegetables, fried / greasy foods, carbonated drinks ACTIVITY You may resume your normal daily activities Spend the remainder of the day resting -  avoid any strenuous activity. CALL M.D.  IF ANY SIGN OF Increasing pain, nausea, vomiting Abdominal distension (swelling) New increased bleeding (oral or rectal) Fever (chills) Pain in chest area Bloody discharge from nose or mouth Shortness of breath FOLLOW-UP INSTRUCTIONS: 
Call Dr. José Miguel Hardwick for any questions or problems. Telephone # [phone] Biopsy results available  in  5 to 7 days. We will see you in the office in 2 weeks. Continue same medications. ENDOSCOPY FINDINGS: 
 Your endoscopy showed a Hiatus Hernia, Gastritis and a possibility of Esophageal Spasm Introducing hospitals & HEALTH SERVICES! Dear Mariposa Navarro: Thank you for requesting a BidR[employer]or account. Our records indicate that you already have an active BidRazor account. You can access your account anytime at [email or web address] Did you know that you can access your hospital and ER discharge instructions at any time in BidRazor? You can also review all of your test results from your hospital stay or ER visit. Additional Information If you have questions, please visit the Frequently Asked Questions section of the BidRazor website at [email or web address] Remember, BidRazor is NOT to be used for urgent needs. For medical emergencies, dial 911. Now available from your iPhone and Android! Introducing Corbin Lucio As a [employer] patient, I wanted to make you aware of our electronic visit tool called Corbin Rylandcee. [employer] 24/7 allows you to connect within minutes with a medical provider 24 hours a day, seven days a week via a mobile device or tablet or logging into a secure website from your computer. You can access Corbin Lucio from anywhere in the [place]. A virtual visit might be right for you when you have a simple condition and feel like you just dont want to get out of bed, or cant get away from work for an appointment, when your regular [facility] provider is not available (evenings, weekends or holidays), or when youre out of town and need minor care. Electronic visits cost only $49 and if the [facility] 24/7 provider determines a prescription is needed to treat your condition, one can be electronically transmitted to a nearby pharmacy*. Please take a moment to enroll today if you have not already done so. The enrollment process is free and takes just a few minutes. To enroll, please download the Workspot/appAttach nidia to your tablet or phone, or visit [email or web address] to enroll on your computer. And, as an [address] [place] [address] patient with a [employer] account, the results of your visits will be scanned into your electronic medical record and your primary care provider will be able to view the scanned results. We urge you to continue to see your regular [facility] provider for your ongoing medical care. And while your primary care provider may not be the one available when you seek a Corbin Lucio virtual visit, the peace of mind you get from getting a real diagnosis real time can be priceless. For more information on Corbin Carbon Saloncee, view our Frequently Asked Questions (FAQs) at [email or web address]. Sincerely, 
 
Cachorro De La Cruz MD 
Chief Medical Officer [employer] *:  certain medications cannot be prescribed via Corbin Carbon Saloncee Providers Seen During Your Hospitalization Provider Specialty Primary office phone Annabel Vasques MD Gastroenterology [phone] Your Primary Care Physician (PCP) Primary Care Physician Office Phone Office Fax [place], [address] [phone] You are allergic to the following Allergen Reactions Codeine Itching Macrobid (Nitrofurantoin Monohyd/M-Cryst) Other (comments) Muscle weakness Pcn (Penicillins) Rash Itching, facial swelling, pounding heart Recent Documentation Weight BMI OB Status Smoking Status 83.9 kg 31.76 kg/m2 Hysterectomy Former Smoker Emergency Contacts Name Discharge Info Relation Home Work Mobile Freddy Chica DISCHARGE CAREGIVER [3] Spouse [3] [phone] ChiKartik resi  Spouse [3] [phone] Patient Belongings The following personal items are in your possession at time of discharge: 
  Dental Appliances: None  Visual Aid: None Please provide this summary of care documentation to your next provider. Signatures-by signing, you are acknowledging that this After Visit Summary has been reviewed with you and you have received a copy. Patient Signature:  ____________________________________________________________ Date:  ____________________________________________________________  
  
[facility] Provider Signature:  ____________________________________________________________ Date:  ____________________________________________________________

## 2018-03-27 NOTE — DISCHARGE INSTRUCTIONS
Dr Amairani Armstrong [address], [address] [facility]  43[address] [facility]  [id number]  1935    EGD DISCHARGE INSTRUCTIONS  Discomfort:  Sore throat- throat lozenges or warm salt water gargle  redness at IV site- apply warm compress to area; if redness or soreness persist- contact your physician  Gaseous discomfort- walking, belching will help relieve any discomfort  You may not operate a vehicle for 12 hours  You may not engage in an occupation involving machinery or appliances for rest of today  You may not drink alcoholic beverages for at least 12 hours  Avoid making any critical decisions for at least 24 hour  DIET  You may have anything by mouth- do not eat or drink for two hours. You may eat and drink after you leave. You may resume your regular diet - however -  remember your colon is empty and a heavy meal will produce gas. Avoid these foods:  vegetables, fried / greasy foods, carbonated drinks      ACTIVITY  You may resume your normal daily activities   Spend the remainder of the day resting -  avoid any strenuous activity. CALL M.D.  IF ANY SIGN OF   Increasing pain, nausea, vomiting  Abdominal distension (swelling)  New increased bleeding (oral or rectal)  Fever (chills)  Pain in chest area  Bloody discharge from nose or mouth  Shortness of breath    FOLLOW-UP INSTRUCTIONS:  Call Dr. Annabel Vasques for any questions or problems. Telephone # [phone]  Biopsy results available  in  5 to 7 days. We will see you in the office in 2 weeks. Continue same medications.     ENDOSCOPY FINDINGS:   Your endoscopy showed a Hiatus Hernia, Gastritis and a possibility of Esophageal Spasm

## 2018-03-27 NOTE — ANESTHESIA POSTPROCEDURE EVALUATION
Post-Anesthesia Evaluation and Assessment    Patient: Leanna Santa MRN: [id number]  SSN: [id number]    YOB: 1935  Age: [de-identified] y.o. Sex: female       Cardiovascular Function/Vital Signs  Visit Vitals    /71    Pulse 70    Temp 36.6 °C (97.8 °F)    Resp 28    Wt 83.9 kg (185 lb)    SpO2 97%    BMI 31.76 kg/m2       Patient is status post MAC anesthesia for Procedure(s):  ESOPHAGOGASTRODUODENOSCOPY (EGD)  ESOPHAGEAL DILATION  ESOPHAGOGASTRODUODENAL (EGD) BIOPSY. Nausea/Vomiting: None    Postoperative hydration reviewed and adequate. Pain:  Pain Scale 1: Numeric (0 - 10) (03/27/18 0848)  Pain Intensity 1: 0 (03/27/18 0848)   Managed    Neurological Status: At baseline    Mental Status and Level of Consciousness: Arousable    Pulmonary Status:   O2 Device: Room air (03/27/18 0848)   Adequate oxygenation and airway patent    Complications related to anesthesia: None    Post-anesthesia assessment completed.  No concerns    Signed By: Alexei Mora MD     March 27, 2018

## 2018-03-27 NOTE — ROUTINE PROCESS
Larisa Frank  1935  [id number]    Situation:  Verbal report received from: Chava Lacey RN  Procedure: Procedure(s):  ESOPHAGOGASTRODUODENOSCOPY (EGD)  ESOPHAGEAL DILATION  ESOPHAGOGASTRODUODENAL (EGD) BIOPSY    Background:    Preoperative diagnosis: DYSPHAGIA  Postoperative diagnosis: Hiatal Hernia  Gastritis  Esophageal Spasm    :  Dr. Rhonda Olmedo  Assistant(s): Endoscopy Technician-1: [facility]  Endoscopy RN-1: Sarah Obrien RN    Specimens:   ID Type Source Tests Collected by Time Destination   1 : Duodenum bx (R/O celiac disease) Preservative   Pat Dodge MD 3/27/2018 6701 Pathology     H. Pylori  yes    Assessment:  Intra-procedure medications   Anesthesia gave intra-procedure sedation and medications, see anesthesia flow sheet yes    Intravenous fluids: NS@ KVO     Vital signs stable     Abdominal assessment: round and soft     Recommendation:  Discharge patient per MD order.   Family    Permission to share finding with family or friend yes

## 2018-03-27 NOTE — PROGRESS NOTES

## 2019-02-07 ENCOUNTER — HOSPITAL ENCOUNTER (OUTPATIENT)
Dept: GENERAL RADIOLOGY | Age: 84
Discharge: HOME | End: 2019-02-07
Payer: MEDICARE

## 2019-02-07 DIAGNOSIS — R06.02 SHORTNESS OF BREATH: ICD-10-CM

## 2019-02-07 PROCEDURE — 71046 X-RAY EXAM CHEST 2 VIEWS: CPT

## 2019-07-01 ENCOUNTER — HOSPITAL ENCOUNTER (OUTPATIENT)
Dept: GENERAL RADIOLOGY | Age: 84
Discharge: HOME | End: 2019-07-01
Payer: MEDICARE

## 2019-07-01 DIAGNOSIS — R06.02 SOB (SHORTNESS OF BREATH): ICD-10-CM

## 2019-07-01 DIAGNOSIS — R05.9 COUGH: ICD-10-CM

## 2019-07-01 PROCEDURE — 71046 X-RAY EXAM CHEST 2 VIEWS: CPT

## 2019-07-12 ENCOUNTER — HOSPITAL ENCOUNTER (OUTPATIENT)
Dept: NUCLEAR MEDICINE | Age: 84
Discharge: HOME | End: 2019-07-12
Attending: INTERNAL MEDICINE
Payer: MEDICARE

## 2019-07-12 ENCOUNTER — HOSPITAL ENCOUNTER (OUTPATIENT)
Dept: GENERAL RADIOLOGY | Age: 84
Discharge: HOME | End: 2019-07-12
Attending: INTERNAL MEDICINE
Payer: MEDICARE

## 2019-07-12 DIAGNOSIS — R06.02 SHORTNESS OF BREATH: ICD-10-CM

## 2019-07-12 PROCEDURE — 78582 LUNG VENTILAT&PERFUS IMAGING: CPT

## 2019-07-12 PROCEDURE — 71046 X-RAY EXAM CHEST 2 VIEWS: CPT

## 2020-01-20 ENCOUNTER — HOSPITAL ENCOUNTER (OUTPATIENT)
Dept: CT IMAGING | Age: 85
Discharge: HOME | End: 2020-01-20
Attending: INTERNAL MEDICINE
Payer: MEDICARE

## 2020-01-20 DIAGNOSIS — F02.80 ALZHEIMER DISEASE (HCC): ICD-10-CM

## 2020-01-20 DIAGNOSIS — R44.0 AUDITORY HALLUCINATIONS: ICD-10-CM

## 2020-01-20 DIAGNOSIS — G30.9 ALZHEIMER DISEASE (HCC): ICD-10-CM

## 2020-01-20 PROCEDURE — 70450 CT HEAD/BRAIN W/O DYE: CPT

## 2020-01-20 RX ORDER — SODIUM CHLORIDE 0.9 % (FLUSH) 0.9 %
10 SYRINGE (ML) INJECTION
Status: DISCONTINUED | OUTPATIENT
Start: 2020-01-20 | End: 2020-01-20

## 2020-01-22 ENCOUNTER — OFFICE VISIT (OUTPATIENT)
Dept: NEUROLOGY | Age: 85
End: 2020-01-22

## 2020-01-22 VITALS
RESPIRATION RATE: 16 BRPM | SYSTOLIC BLOOD PRESSURE: 124 MMHG | BODY MASS INDEX: 29.71 KG/M2 | HEART RATE: 89 BPM | WEIGHT: 174 LBS | HEIGHT: 64 IN | DIASTOLIC BLOOD PRESSURE: 76 MMHG | TEMPERATURE: 96.8 F | OXYGEN SATURATION: 93 %

## 2020-01-22 DIAGNOSIS — R44.3 HALLUCINATIONS: ICD-10-CM

## 2020-01-22 DIAGNOSIS — R41.3 MEMORY LOSS: Primary | ICD-10-CM

## 2020-01-22 DIAGNOSIS — H53.2 DIPLOPIA: ICD-10-CM

## 2020-01-22 RX ORDER — HYDROCHLOROTHIAZIDE 25 MG/1
25 TABLET ORAL DAILY
COMMUNITY

## 2020-01-22 RX ORDER — MELATONIN
DAILY
Status: ON HOLD | COMMUNITY
End: 2021-11-30

## 2020-01-22 RX ORDER — ALBUTEROL SULFATE 90 UG/1
AEROSOL, METERED RESPIRATORY (INHALATION)
COMMUNITY
End: 2020-05-23

## 2020-01-22 RX ORDER — BUDESONIDE AND FORMOTEROL FUMARATE DIHYDRATE 160; 4.5 UG/1; UG/1
2 AEROSOL RESPIRATORY (INHALATION) 2 TIMES DAILY
COMMUNITY
End: 2020-05-23

## 2020-01-22 RX ORDER — BUPROPION HYDROCHLORIDE 150 MG/1
TABLET, EXTENDED RELEASE ORAL 2 TIMES DAILY
COMMUNITY
End: 2020-02-13

## 2020-01-22 NOTE — PROGRESS NOTES
Chief Complaint   Patient presents with    Neurologic Problem     hallucinations       Referred by: Dr Yulissa Sellers      HPI    Mrs. Viji Swartz is an 82-year-old woman here for hallucinations. She tells me in preface that for about 10 years she is noticed left-sided tinnitus with occasionally on the right. In the past 2 years she has noticed auditory hallucinations described as music in her head. She hears [place] music or [employer] typically. Music is all day long and can be very distracting for her. She takes medication to help her sleep at night to resolve the symptoms. She has some depression and anxiety in her history but nothing debilitating. No history of psychiatric admissions. No nightmares. No history of seizures. She does have a history of migraine that stopped after menopause. She has been taking clonazepam and doxepin 150 mg nightly for about 10 years for her sleep. She thinks her memory is poor as far as short-term issues but she is not getting lost and her ADLs are intact. No tremor. No unusual constipation. She is complaining of diplopia in the past several months. She is had various falls over the years usually mechanical in nature. No episodes of loss of consciousness she can tell me. Review of Systems   Eyes: Positive for double vision. Neurological: Negative for tremors and headaches. Psychiatric/Behavioral: Positive for depression, hallucinations and memory loss. Negative for suicidal ideas. The patient is nervous/anxious and has insomnia. All other systems reviewed and are negative.       Past Medical History:   Diagnosis Date    Anxiety     Aortic valve disorders 4/7/2011    Arthritis     Asthma     off and on per pt    Chronic pain     both legs/had 2 ESIs in back/has 2 bad discs in back    Coronary artery disease     Coronary atherosclerosis of native coronary artery 4/7/2011    pt unsure of this    Diabetes ([address]     GERD (gastroesophageal reflux disease) hiatal hernia    Heart failure (HCC)     CHF    Hypertension     Ill-defined condition     \"broken tailbone\"    Ill-defined condition     obese per pt    Left heart failure ([address] 2011    Other chest pain 2011    Other ill-defined conditions(799.89)     fibromyalgia    Other ill-defined conditions(799.89)     back pain    Unspecified sleep apnea     does not wear CPAP - minor     Family History   Problem Relation Age of Onset    Colon Cancer Mother     Heart Failure Mother     Diabetes Father     Heart Disease Sister         pumping at 10%    Other Sister         aneurysm in heart    Diabetes Sister     Diabetes Daughter      Social History     Socioeconomic History    Marital status:      Spouse name: Not on file    Number of children: Not on file    Years of education: Not on file    Highest education level: Not on file   Occupational History    Not on file   Social Needs    Financial resource strain: Not on file    Food insecurity:     Worry: Not on file     Inability: Not on file    Transportation needs:     Medical: Not on file     Non-medical: Not on file   Tobacco Use    Smoking status: Former Smoker     Last attempt to quit: 8/15/1984     Years since quittin.4    Smokeless tobacco: Never Used    Tobacco comment: former cigarette smoker - smoked for 10 yrs   Substance and Sexual Activity    Alcohol use: No    Drug use: No    Sexual activity: Not Currently     Partners: Male   Lifestyle    Physical activity:     Days per week: Not on file     Minutes per session: Not on file    Stress: Not on file   Relationships    Social connections:     Talks on phone: Not on file     Gets together: Not on file     Attends Buddhist service: Not on file     Active member of club or organization: Not on file     Attends meetings of clubs or organizations: Not on file     Relationship status: Not on file    Intimate partner violence:     Fear of current or ex partner: Not on file     Emotionally abused: Not on file     Physically abused: Not on file     Forced sexual activity: Not on file   Other Topics Concern    Not on file   Social History Narrative    Not on file     Current Outpatient Medications   Medication Sig    hydroCHLOROthiazide (HYDRODIURIL) 25 mg tablet Take 25 mg by mouth daily.  buPROPion SR (WELLBUTRIN SR) 150 mg SR tablet Take  by mouth two (2) times a day.  cholecalciferol (VITAMIN D3) (1000 Units /25 mcg) tablet Take  by mouth daily.  budesonide-formoteroL (SYMBICORT) 160-4.5 mcg/actuation HFAA Take 2 Puffs by inhalation two (2) times a day.  albuterol (VENTOLIN HFA) 90 mcg/actuation inhaler Take  by inhalation.  glipiZIDE (GLUCOTROL) 5 mg tablet Take 5 mg by mouth daily.  clonazePAM (KLONOPIN) 0.5 mg tablet Take 0.5 mg by mouth nightly as needed.  spironolactone (ALDACTONE) 25 mg tablet Take 25 mg by mouth daily.  ASPIRIN PO Take 81 mg by mouth daily.  meloxicam (MOBIC) 15 mg tablet Take 15 mg by mouth daily.  doxepin (SINEQUAN) 150 mg capsule Take 150 mg by mouth nightly.  MULTIVITAMIN PO Take  by mouth daily. Takes one po daily. For women over 50.    gabapentin (NEURONTIN) 100 mg capsule Take 100 mg by mouth two (2) times a day.  omeprazole (PRILOSEC) 40 mg capsule Take 40 mg by mouth daily as needed.  fluoxetine (PROZAC) 40 mg capsule Take 40 mg by mouth daily. No current facility-administered medications for this visit. Allergies   Allergen Reactions    Codeine Itching    Macrobid [Nitrofurantoin Monohyd/M-Cryst] Other (comments)     Muscle weakness    Pcn [Penicillins] Rash     Itching, facial swelling, pounding heart         Neurologic Exam     Mental Status   Oriented to person, place, and time. Intact month day and year quickly     Cranial Nerves   Cranial nerves II through XII intact. Motor Exam   Muscle bulk: normal    Strength   Strength 5/5 throughout.      Sensory Exam   Light touch normal.     Gait, Coordination, and Reflexes     Gait  Gait: normal (Non-shuffling good tempo)    Coordination   Finger to nose coordination: normal    Tremor   Resting tremor: absent    Reflexes   Right brachioradialis: 2+  Left brachioradialis: 2+  Right biceps: 2+  Left biceps: 2+  Right triceps: 2+  Left triceps: 2+  Right patellar: 2+  Left patellar: 2+  Right achilles: 2+  Left achilles: 2+No bradykinesia   no resting tremor     Physical Exam   Constitutional: She is oriented to person, place, and time. She appears well-developed and well-nourished. Cardiovascular: Normal rate. Pulmonary/Chest: Effort normal.   Neurological: She is oriented to person, place, and time. She has normal strength. She has a normal Finger-Nose-Finger Test. Gait normal.   Reflex Scores:       Tricep reflexes are 2+ on the right side and 2+ on the left side. Bicep reflexes are 2+ on the right side and 2+ on the left side. Brachioradialis reflexes are 2+ on the right side and 2+ on the left side. Patellar reflexes are 2+ on the right side and 2+ on the left side. Achilles reflexes are 2+ on the right side and 2+ on the left side. Skin: Skin is warm and dry. Psychiatric:   Mood is good, talkative and engaging with good word production and tempo   Vitals reviewed.     Visit Vitals  /76   Pulse 89   Temp 96.8 °F (36 °C)   Resp 16   Ht 5' 4\" (1.626 m)   Wt 78.9 kg (174 lb)   SpO2 93%   BMI 29.87 kg/m²       No results found for: WBC, WBCT, WBCPOC, HGB, HGBPOC, HCT, HCTPOC, PLT, PLTPOC, MCV, MCVPOC, HGBEXT, HCTEXT, PLTEXT  Lab Results   Component Value Date/Time    Creatinine (POC) 1.5 (H) 02/17/2016 01:14 PM      No results found for: CHOL, CHOLPOCT, HDL, LDL, LDLC, LDLCPOC, LDLCEXT, TRIGL, TGLPOCT, CHHD, CHHDX  No results found for: GPT, ALTPOC, ALT, SGOT, ASTPOC, GGT, AP, APIT, APX, CBIL, TBIL, TBILI, ALB, ALBPOC, TP, NH3, NH4, INR, INREXT, PTP, PTINR, PTEXT, PLT, PLTPOC, HCABQL, HBSAG, AFP, INREXT, PTEXT, PLTEXT       CT Results (maximum last 3): Results from [place] encounter on 01/20/20   CT HEAD WO CONT    Narrative Indication:  auditory hallucinations     Comparison: January 2009    Findings: 5 mm axial images were obtained from the skull base through the  vertex. CT dose reduction was achieved through the use of a standardized protocol  tailored for this examination and automatic exposure control for dose  modulation. The ventricles and cortical sulci are prominent, compatible with age related  volume loss. There is no evidence of intracranial hemorrhage, mass, mass effect,  or acute infarct. There is periventricular white matter disease. No extra-axial  fluid collections are seen. The visualized paranasal sinuses and mastoid air  cells are clear. The orbital structures are unremarkable. No osseous  abnormalities are seen. Impression Impression:   1. No evidence of acute infarct or intracranial hemorrhage. 2. Mild periventricular white matter disease is likely secondary to chronic  small vessel ischemic changes. Results from [facility] encounter on 07/24/17   CT NECK SOFT TISSUE W CONT       MRI Results (maximum last 3): Results from [place] encounter on 10/06/14   MRI LUMB SPINE WO CONT    Narrative **Final Report**       ICD Codes / Adm. Diagnosis: 724.4   / Thoracic or lumbosacral neurit    Examination:  MR ROSA SPINE WO CON  - [id number] - Oct  6 2014  3:11PM  Accession No:  [id number]  Reason:  Lumbar radiculopathy      REPORT:  EXAM:  MR ROSA SPINE WO CON  Clinical history: Back pain      INDICATION:  Lumbar radiculopathy    COMPARISON: None    TECHNIQUE: MR imaging of the lumbar spine was performed with sagittal T1,   T2, STIR;  axial T1, T2.    CONTRAST:  None. FINDINGS:    There is normal alignment of the lumbar spine. Vertebral body heights are   maintained. Marrow signal is normal.    The conus medullaris terminates at L1-L2.  Signal and caliber of the distal spinal cord are within normal limits. The paraspinal soft tissues are within normal limits. No fracture or   dislocation. Abdominal aorta normal caliber. Lower thoracic spine: No herniation or stenosis. L1-L2:  No herniation or stenosis. Disc bulge. L2-L3:  No herniation or stenosis. Minimal facet arthropathy. Mild left   foraminal disc protrusion. Spinal canal is patent. Foramina are patent    L3-L4:  Facet arthropathy. Disc bulge. Mild central canal stenosis. Foramina   are patent    L4-L5:  Facet arthropathy. Mild right paracentral disc protrusion. The   spinal canal is patent. . The foramina are patent    L5-S1:  Disc desiccation. Mild left foraminal disc protrusion. Spinal canal   is patent. Mild subarticular stenosis on the left. IMPRESSION:  Minimal multilevel disc and facet degenerative change. Mild subarticular stenosis on the left at L5-S1. Mild central canal stenosis at L3-L4. Signing/Reading Doctor: Robert Lee ([id number])    Approved: Robert Lee ([id number])  Oct  6 2014  3:56PM                                      PET Results (maximum last 3): No results found for this or any previous visit. Assessment and Plan   Diagnoses and all orders for this visit:    1. Memory loss  -     VITAMIN B12 & FOLATE  -     MRI BRAIN WO CONT; Future  -     PET BRAIN METABOLIC EVAL (INI); Future  -     EEG; Future  -     REFERRAL TO NEUROPSYCHOLOGY    2. Hallucinations  -     VITAMIN B12 & FOLATE  -     MRI BRAIN WO CONT; Future  -     PET BRAIN METABOLIC EVAL (INI); Future  -     EEG; Future  -     REFERRAL TO NEUROPSYCHOLOGY    3. Diplopia  -     MRI BRAIN WO CONT; Future      82-year-old woman having auditory hallucinations persistently now daily. Differential includes epilepsy, migraine, neurodegenerative disease, medication effect. Low suspicion for Lewy body as I am not seeing telltale signs of parkinsonism.   Epilepsy is possible so we will check EEG since she is symptomatic essentially daily. Check MRI brain given the symptoms in addition to the diplopia being reported. Check a PET scan looking for patterns consistent with typical neurodegenerative disease. Memory loss could be affected by the long-term use of benzodiazepine and try cyclic antidepressants which can cause cognitive slowing or impairment over time. I am going to have formal neuropsychological testing done to ensure were not dealing with a primary psychiatric disease. Check B12 level. I would like her to call me after her work-up is done. We will see her in follow-up. I reviewed and decided to continue the current medications. This clinical note was dictated with an electronic dictation software that can make unintentional errors. If there are any questions, please contact me directly for clarification. A notice of this visit/encounter being completed has been sent electronically to the patient's PCP and/or referring provider.      [address] [place] [address], [facility] [address]  Diplomate AGUILA

## 2020-01-22 NOTE — PATIENT INSTRUCTIONS
PET Scan: About This Test 
What is it? A PET scan is a test that uses a special type of camera and a radioactive substance called a \"tracer\" to look at organs in the body. PET stands for positron emission tomography. During the test, the tracer liquid is put into a vein in your arm. It moves through your body and collects in the specific organ or tissue, where it gives off tiny positively charged particles (positrons). The camera records the positrons and turns the recording into pictures on a computer. A computed tomography (CT) scan is often done at the same time as a PET scan. Why is this test done? A PET scan is often used to: 
· Look for cancer. · Find heart problems. · Find brain disorders. How can you prepare for the test? 
· Tell your doctor if: 
? You have diabetes. ? You are or might be pregnant, or you are breastfeeding. If you are breastfeeding, do not breastfeed your baby for 2 days after this test. During this time, you can give your baby breast milk you stored before the test, or you can give formula. Throw out the breast milk you pump for 2 days after the test. 
? You get nervous in confined spaces. You may need medicine to help you relax. · Don't drink caffeine for 24 hours before a PET scan of your heart. · Don't do any exercise or other strenuous activity for at least 48 hours before this test. 
· Don't eat or drink (except water) for at least 6 hours before this test. 
What happens during the test? 
· A radioactive tracer will be given in a vein (IV). You may need to wait 30 to 60 minutes for the tracer to move through your body. During this time, you will need to avoid moving and talking. · You will lie on a table that is attached to a PET scanner. · The table will pass slowly through the PET scanner, which is shaped like a doughnut. The scanner picks up signals from the tracer in your body. It is very important to lie still while each scan is being done. What else should you know about the test? 
· You may feel a quick sting or pinch when the IV is put in your arm. The tracer is not likely to cause side effects. If you don't feel well, tell your doctor. How long does the test take? · The test will take 1 to 3 hours. What happens after the test? 
· You will probably be able to go home right away. · You can go back to your usual activities right away. · After the test: ? Allergic reactions to the tracer are very rare. ? In rare cases, some soreness or swelling may develop at the IV site where the radioactive tracer was put in. Apply a moist, warm compress to your arm. 
? Anytime you're exposed to radiation, there's a small chance of damage to cells or tissue. That's the case even with the low-level radioactive tracer used for this test. But the chance of damage is very low compared with the benefits of the test. 
? Most of the tracer will leave your body through your urine or stool within a day. So be sure to flush the toilet right after you use it, and wash your hands well with soap and water. The amount of radiation in the tracer is very small. This means it isn't a risk for people to be around you after the test. 
? If you are breastfeeding, you will need to use saved breast milk or formula for 2 days after the PET scan. This is so you won't pass the tracer to your baby. Follow-up care is a key part of your treatment and safety. Be sure to make and go to all appointments, and call your doctor if you are having problems. It's also a good idea to keep a list of the medicines you take. Ask your doctor when you can expect to have your test results. Where can you learn more? Go to http://herminio-los.info/. Enter J409 in the search box to learn more about \"PET Scan: About This Test.\" Current as of: March 28, 2019 Content Version: 12.2 © 4557-0499 [employer].  Care instructions adapted under license by [facility] [address] (which disclaims liability or warranty for this information). If you have questions about a medical condition or this instruction, always ask your healthcare professional. [address] any warranty or liability for your use of this information. MRI of the Head: About This Test 
What is it? MRI (magnetic resonance imaging) is a test that uses a magnetic field and pulses of radio wave energy to make pictures of the organs and structures inside the body. An MRI of the head can give your doctor information about your brain, eyes, ears, and nerves. When you have an MRI, you lie on a table and the table moves into the MRI machine. Why is this test done? An MRI of the head can help find problems such as tumors and infection. It also can check symptoms of a head injury or of diseases such as multiple sclerosis (MS) and stroke. How can you prepare for the test? 
Talk to your doctor about all your health conditions before the test. For example, tell your doctor if: 
· You are allergic to any medicines. · You are or might be pregnant. · You have a pacemaker, an artificial limb, any metal pins or metal parts in your body, metal heart valves, metal clips in your brain, metal implants in your ears, or any other implanted or prosthetic medical device. · You have an intrauterine device (IUD) in place. · You get nervous in confined spaces. You may need medicine to help you relax. · You wear a patch that contains medicine. · You have kidney disease. What happens before the test? 
· You will remove all metal objects, such as hearing aids, dentures, jewelry, watches, and hairpins. · You may need to take off some of your clothes. You will be given a gown to wear during the test. If you do leave some clothes on, make sure you take everything out of your pockets.  
· You may have contrast material (dye) put into your arm through a tube called an IV. Contrast material helps doctors see specific organs, blood vessels, and most tumors. What happens during the test? 
· You will lie on your back on a table that is part of the MRI scanner. Your head, chest, and arms may be held with straps to help you lie still. · The table will slide into the space that contains the magnet. A device called a coil may be placed over or wrapped around your head. · Inside the scanner you will hear a fan and feel air moving. You may hear tapping, thumping, or snapping noises. You may be given earplugs or headphones to reduce the noise. · You will be asked to hold still during the scan. You may be asked to hold your breath for short periods. · You may be alone in the scanning room, but a technologist will be watching you through a window and talking with you during the test. 
What else should you know about the test? 
· An MRI does not hurt. · You may feel warmth in the area being examined. This is normal. 
· A dye (contrast material) that contains gadolinium may be used in this test. Be sure to tell your doctor if: 
? You are pregnant or think you may be pregnant. ? You have kidney problems. ? You've had more than one test that used gadolinium. · The U.S. Food and Drug Administration (FDA) has safety warnings about gadolinium. But for most people, the benefit of its use in this test outweighs the risk. · If a dye is used, you may feel a quick sting or pinch and some coolness when the IV is started. The dye may give you a metallic taste in your mouth. Some people feel sick to their stomach or get a headache. · If you breastfeed and are concerned about whether the dye used in this test is safe, talk to your doctor. Most experts believe that very little dye passes into breast milk and even less is passed on to the baby. But if you prefer, you can store some of your breast milk ahead of time and use it for a day or two after the test. 
How long does the test take? · The test usually takes 30 to 60 minutes but can take as long as 2 hours. What happens after the test? 
· You will probably be able to go home right away, depending on the reason for the test. 
· You can go back to your usual activities right away. Follow-up care is a key part of your treatment and safety. Be sure to make and go to all appointments, and call your doctor if you are having problems. It's also a good idea to keep a list of the medicines you take. Ask your doctor when you can expect to have your test results. Where can you learn more? Go to http://herminio-los.info/. Enter U713 in the search box to learn more about \"MRI of the Head: About This Test.\" Current as of: March 28, 2019 Content Version: 12.2 © 6569-8387 Healthwise, Incorporated. Care instructions adapted under license by [employer] (which disclaims liability or warranty for this information). If you have questions about a medical condition or this instruction, always ask your healthcare professional. [address] any warranty or liability for your use of this information. Electroencephalogram (EEG): About This Test 
What is it? An electroencephalogram (EEG) lets a doctor see the electrical activity of your brain. You will have small pads or patches attached to different places on your head. These are called electrodes. Wires connect the electrodes to a computer. The computer records the activity of the brain. This looks like wavy lines on the computer screen or on paper. Why is this test done? The test is often used to diagnose epilepsy. It helps a doctor know what types of seizures are happening. An EEG can also check brain activity in people with sleep disorders. It can also help a doctor know why a person passed out (lost consciousness). How can you prepare for the test? 
· Tell your doctor if you are taking any medicines.  Your doctor may ask you to stop taking certain medicines before the test. These include sedatives and tranquilizers, muscle relaxants, sleeping aids, and seizure medicines. · Do not eat or drink anything with caffeine in it for 12 hours before the test. This includes cola, energy drinks, and chocolate. · Shampoo your hair and rinse with clear water the evening before or the morning of the test. Do not put any hair conditioner or oil on after you wash your hair. · Your doctor may ask you not to sleep the night before the test or to sleep for only about 4 or 5 hours. This is because some types of brain activity can only be seen if you are asleep. If your doctor asks you to get less sleep than normal, plan to have someone drive you to and from the test. 
What happens during the test? 
· You will lie on your back on a bed or table. Or you might relax in a chair with your eyes closed. · A technologist will attach the electrodes to different places on your head. Or you might get a cap with fixed electrodes on it. · You will lie still with your eyes closed. The technologist will tell you not to talk unless you need to. · The technologist may ask you to: ? Breathe deeply and rapidly. This is called hyperventilating. ? Look at a bright, flashing light called a strobe. ? Go to sleep. If you can't fall asleep, you may get medicine to help you. What else should you know about the test? 
· There is no pain. No electrical current goes through your body. · If you have a seizure disorder such as epilepsy, the flashing lights or hyperventilation may cause a seizure. The technologist is trained to take care of you if this happens. · If electrodes are put in your nose, they may tickle. In rare cases, they can also cause some soreness or a small amount of bleeding for 1 to 2 days after the test. 
How long does the test take? · The test will take about 1 to 2 hours.  
What happens after the test? 
 · You will probably be able to go home right away. But if you didn't sleep your normal amount before the test, have someone drive you home. · You can go back to your usual activities right away. When should you call for help? Watch closely for changes in your health, and be sure to contact your doctor if: 
· You have any problems that you think may be from the test. 
· You have any questions about the test or have not received your results. Follow-up care is a key part of your treatment and safety. Be sure to make and go to all appointments, and call your doctor if you are having problems. It's also a good idea to keep a list of the medicines you take. Ask your doctor when you can expect to have your test results. Where can you learn more? Go to http://herminio-los.info/. Enter F196 in the search box to learn more about \"Electroencephalogram (EEG): About This Test.\" Current as of: March 28, 2019 Content Version: 12.2 © 8012-5452 [employer]. Care instructions adapted under license by [employer] (which disclaims liability or warranty for this information). If you have questions about a medical condition or this instruction, always ask your healthcare professional. [address] any warranty or liability for your use of this information. Vitamin B12: About This Test 
What is it? This blood test measures the amount of vitamin B12 in your blood. Your body needs this B vitamin to make blood cells and to keep your nervous system healthy. Why is this test done? This test is used to: · Check for vitamin B12 deficiency anemia. It's often done for those who've had stomach or bowel surgery. It's also important for those who have problems of the small intestine or a family history of this anemia. · Check on the B12 level for someone who is on a diet that restricts certain foods (such as vegan). · Find the cause of other types of anemia. · Help find the cause of a decrease in mental abilities. · Find the cause of other symptoms. This can include tingling or numbness of the arms or legs. How can you prepare for the test? 
· In general, you don't need to prepare before having this test. Your doctor may give you some specific instructions. What happens during the test? 
· A health professional takes a sample of your blood. What else should you know about the test? 
· Your results will include an explanation of what a \"normal\" result is. This is called a \"reference range. \" It is just a guide. Your doctor will evaluate your results based on your health and other factors. This means that a value that falls outside the normal values listed may still be normal for you. · Vitamin B12 is usually measured at the same time as folic acid is tested, because a lack of either one can lead to a form of anemia called megaloblastic anemia. How long does the test take? The test will take a few minutes. What happens after the test? 
· You will probably be able to go home right away. · You can go back to your usual activities right away. Follow-up care is a key part of your treatment and safety. Be sure to make and go to all appointments, and call your doctor if you are having problems. It's also a good idea to keep a list of the medicines you take. Ask your doctor when you can expect to have your test results. Where can you learn more? Go to http://herminio-los.info/. Enter Q213 in the search box to learn more about \"Vitamin B12: About This Test.\" Current as of: March 28, 2019 Content Version: 12.2 © 9346-5485 Healthwise, Incorporated. Care instructions adapted under license by [employer] (which disclaims liability or warranty for this information).  If you have questions about a medical condition or this instruction, always ask your healthcare professional. [employer] disclaims any warranty or liability for your use of this information.

## 2020-01-22 NOTE — PROGRESS NOTES
New pt here for ringing in her ears but she says she hers music (not ringing) She says she has episodes of double vision. Daughter (retired nurse) thinks pt is having hallucinations. Pt does have hx of head injury.

## 2020-01-23 LAB
FOLATE SERPL-MCNC: >20 NG/ML
VIT B12 SERPL-MCNC: 674 PG/ML (ref 232–1245)

## 2020-01-24 ENCOUNTER — TELEPHONE (OUTPATIENT)
Dept: NEUROLOGY | Age: 85
End: 2020-01-24

## 2020-01-24 NOTE — TELEPHONE ENCOUNTER
----- Message from Hans Miller sent at 1/24/2020 11:41 AM EST -----  Regarding: Sydnee Bryson DO/Telephone  General Message/Vendor Calls    Caller's first and last name:    Tee Rubio     Reason for call: Pt has not received a call back to schedule the following appt's: MRI on the Brain without Contrast, Pet Brain Metabolic Evaluation, and a EGG with a referral to see a Neuropsychic.       Callback required yes/no and why: Yes     Best contact number(s): H: [phone] or [phone]      Details to clarify the request: N/A

## 2020-01-24 NOTE — TELEPHONE ENCOUNTER
Spoke with pt. Reminded her that I gave her the phone Number to scheduling if they have not called her in a week. suprapubic

## 2020-02-13 ENCOUNTER — OFFICE VISIT (OUTPATIENT)
Dept: OBGYN CLINIC | Age: 85
End: 2020-02-13

## 2020-02-13 VITALS
BODY MASS INDEX: 29.37 KG/M2 | DIASTOLIC BLOOD PRESSURE: 62 MMHG | WEIGHT: 172 LBS | HEIGHT: 64 IN | SYSTOLIC BLOOD PRESSURE: 128 MMHG

## 2020-02-13 DIAGNOSIS — N89.8 VAGINAL DISCHARGE: Primary | ICD-10-CM

## 2020-02-13 DIAGNOSIS — N76.2 ACUTE VULVITIS: ICD-10-CM

## 2020-02-13 LAB — WET MOUNT POCT, WMPOCT: NORMAL

## 2020-02-13 RX ORDER — TERCONAZOLE 4 MG/G
1 CREAM VAGINAL
Qty: 1 TUBE | Refills: 0 | Status: SHIPPED | OUTPATIENT
Start: 2020-02-13 | End: 2020-02-20

## 2020-02-13 RX ORDER — FLUCONAZOLE 150 MG/1
150 TABLET ORAL DAILY
Qty: 1 TAB | Refills: 0 | Status: ON HOLD | OUTPATIENT
Start: 2020-02-13 | End: 2021-11-30

## 2020-02-13 RX ORDER — ESCITALOPRAM OXALATE 10 MG/1
10 TABLET ORAL DAILY
COMMUNITY
End: 2020-05-23

## 2020-02-13 NOTE — PATIENT INSTRUCTIONS

## 2020-02-13 NOTE — PROGRESS NOTES
[facility]  [email or web address]  [phone]    Darryle Silvius, MD, FACOG       OB/GYN Problem visit    Chief Complaint:   Chief Complaint   Patient presents with    Vaginal Pain       History of Present Illness: This is a new problem being evaluated by this provider. The patient is a 82 y.o.  female who reports having pelvic floor pressure and discomfort for 1 months. She does not c/o incontinence or feeling any bulges in vaginal area. She reports the symptoms are has worsened. Aggravating factors include sitting, standing for too long. Alleviating factors include none. Using a different wipe.   +recent abx for bronchitis. She does not have other concerns. LMP: No LMP recorded. Patient has had a hysterectomy.     PFSH:  Past Medical History:   Diagnosis Date    Anxiety     Aortic valve disorders 2011    Arthritis     Asthma     off and on per pt    Chronic pain     both legs/had 2 ESIs in back/has 2 bad discs in back    Coronary artery disease     Coronary atherosclerosis of native coronary artery 2011    pt unsure of this    Diabetes ([address]     GERD (gastroesophageal reflux disease)     hiatal hernia    Heart failure ([address]     CHF    Hypertension     Ill-defined condition     \"broken tailbone\"    Ill-defined condition     obese per pt    Left heart failure ([address] 2011    Other chest pain 2011    Other ill-defined conditions(799.89)     fibromyalgia    Other ill-defined conditions(799.89)     back pain    Unspecified sleep apnea     does not wear CPAP - minor     Past Surgical History:   Procedure Laterality Date    CARDIAC SURG PROCEDURE UNLIST      aortic valve replacement - cow valve    COLONOSCOPY N/A 10/27/2016    COL[facility]COPY    HX APPENDECTOMY      done with cholecystectomy    HX GI      cholecystectomy    HX GYN      hysterectomy (left ovaries), menorrhagia    HX HEENT      tonsillectomy x 1 as of 10/26/2016    HX ORTHOPAEDIC      bilateral partial knee replacements - inside of both knees replaced    HX ORTHOPAEDIC      bilateral trigger finger release - total of 4 - twice on middle finger on each hand    HX OTHER SURGICAL      EGD/colonoscopy x 3 - colon polyp    HX TONSILLECTOMY      HX WISDOM TEETH EXTRACTION      IMPLANT BREAST SILICONE/EQ Bilateral     Removed are 3 different sets/removed - no implants at this time     Family History   Problem Relation Age of Onset    Colon Cancer Mother     Heart Failure Mother     Diabetes Father     Heart Disease Sister         pumping at 10%    Other Sister         aneurysm in heart    Diabetes Sister     Diabetes Daughter      Social History     Tobacco Use    Smoking status: Former Smoker     Last attempt to quit: 8/15/1984     Years since quittin.5    Smokeless tobacco: Never Used    Tobacco comment: former cigarette smoker - smoked for 10 yrs   Substance Use Topics    Alcohol use: No    Drug use: No     Allergies   Allergen Reactions    Codeine Itching    Macrobid [Nitrofurantoin Monohyd/M-Cryst] Other (comments)     Muscle weakness    Pcn [Penicillins] Rash     Itching, facial swelling, pounding heart     Current Outpatient Medications   Medication Sig    escitalopram oxalate (LEXAPRO) 10 mg tablet Take 10 mg by mouth daily.  fluconazole (DIFLUCAN) 150 mg tablet Take 1 Tab by mouth daily.  terconazole (TERAZOL 7) 0.4 % vaginal cream Insert 1 Applicator into vagina nightly for 7 days.  hydroCHLOROthiazide (HYDRODIURIL) 25 mg tablet Take 25 mg by mouth daily.  cholecalciferol (VITAMIN D3) (1000 Units /25 mcg) tablet Take  by mouth daily.  budesonide-formoteroL (SYMBICORT) 160-4.5 mcg/actuation HFAA Take 2 Puffs by inhalation two (2) times a day.  albuterol (VENTOLIN HFA) 90 mcg/actuation inhaler Take  by inhalation.  glipiZIDE (GLUCOTROL) 5 mg tablet Take 5 mg by mouth daily.     clonazePAM (KLONOPIN) 0.5 mg tablet Take 0.5 mg by mouth nightly as needed.  spironolactone (ALDACTONE) 25 mg tablet Take 25 mg by mouth daily.  ASPIRIN PO Take 81 mg by mouth daily.  meloxicam (MOBIC) 15 mg tablet Take 15 mg by mouth daily.  doxepin (SINEQUAN) 150 mg capsule Take 150 mg by mouth nightly.  MULTIVITAMIN PO Take  by mouth daily. Takes one po daily. For women over 48. No current facility-administered medications for this visit. Review of Systems:  History obtained from the patient  Constitutional: negative for fevers, chills and weight loss  ENT ROS: negative for - hearing change, oral lesions or visual changes  Respiratory: negative for cough, wheezing or dyspnea on exertion  Cardiovascular: negative for chest pain, irregular heart beats, exertional chest pressure/discomfort  Gastrointestinal: negative for dysphagia, nausea and vomiting  Genito-Urinary ROS:  see HPI  Inteument/breast: negative for rash, breast lump and nipple discharge  Musculoskeletal:negative for stiff joints, neck pain and muscle weakness  Endocrine ROS: negative for - breast changes, galactorrhea or temperature intolerance  Hematological and Lymphatic ROS: negative for - blood clots, bruising or swollen lymph nodes    Physical Exam:  Visit Vitals  /62   Ht 5' 4\" (1.626 m)   Wt 172 lb (78 kg)   BMI 29.52 kg/m²       GENERAL: alert, well appearing, and in no distress  HEAD: normocephalic, atraumatic.    PULM: clear to auscultation, no wheezes, rales or rhonchi, symmetric air entry   COR: normal rate and regular rhythm, S1 and S2 normal   ABDOMEN: soft, nontender, nondistended, no masses or organomegaly   EGBUS: no lesions, minimal posterior vulvar inflammation, no masses  VULVA: normal appearing vulva with no masses, tenderness or lesions, anterior laxity   VAGINA: normal appearing vagina with normal color, no lesions, minimal white discharge  UTERUS: absent  ADNEXA: normal adnexa in size, nontender and no masses  NEURO: alert, oriented, normal speech    Assessment:  Encounter Diagnoses   Name Primary?  Vaginal discharge Yes    Acute vulvitis        Plan:  The patient is advised that she should contact the office if she does not note improvement or if symptoms recur  Recommend follow up with PCP for non-gynecologic complaints and chronic medical problems. She should contact our office with any questions or concerns  She could keep her routine annual exam appointment. We discussed potential causes of vaginal discharge/irritation. We discussed good vulvar hygiene. Recommended avoid vaginal irritants. Discussed use of mild soaps/detergents. Follow up if NI. We reviewed wet prep findings with the patient at her visit.   nuswab not collected b/c insurance coverage    Orders Placed This Encounter    AMB POC WET PREP (AKA STAIN, INTERPRET, WET MOUNT)    fluconazole (DIFLUCAN) 150 mg tablet    terconazole (TERAZOL 7) 0.4 % vaginal cream       Results for orders placed or performed in visit on 02/13/20   AMB POC SMEAR, STAIN & INTERPRET, WET MOUNT   Result Value Ref Range    Wet mount (POC)      Narrative    LYNDSAY    Hypae: negative  Buds: negative    Wet Prep:  Trich: negative  Clue cells: negative  Hyphae: negative  Buds: negative  WBC's: normal

## 2020-02-17 ENCOUNTER — HOSPITAL ENCOUNTER (OUTPATIENT)
Dept: NEUROLOGY | Age: 85
Discharge: HOME | End: 2020-02-17
Attending: PSYCHIATRY & NEUROLOGY
Payer: MEDICARE

## 2020-02-17 ENCOUNTER — HOSPITAL ENCOUNTER (OUTPATIENT)
Dept: MRI IMAGING | Age: 85
Discharge: HOME | End: 2020-02-17
Attending: PSYCHIATRY & NEUROLOGY
Payer: MEDICARE

## 2020-02-17 DIAGNOSIS — R41.3 MEMORY LOSS: ICD-10-CM

## 2020-02-17 DIAGNOSIS — R44.3 HALLUCINATIONS: ICD-10-CM

## 2020-02-17 DIAGNOSIS — H53.2 DIPLOPIA: ICD-10-CM

## 2020-02-17 PROCEDURE — 95816 EEG AWAKE AND DROWSY: CPT

## 2020-02-17 PROCEDURE — 70551 MRI BRAIN STEM W/O DYE: CPT

## 2020-02-18 ENCOUNTER — HOSPITAL ENCOUNTER (OUTPATIENT)
Dept: PET IMAGING | Age: 85
Discharge: HOME | End: 2020-02-18
Attending: PSYCHIATRY & NEUROLOGY
Payer: MEDICARE

## 2020-02-18 VITALS — HEIGHT: 65 IN | BODY MASS INDEX: 28.32 KG/M2 | WEIGHT: 170 LBS

## 2020-02-18 DIAGNOSIS — R44.3 HALLUCINATIONS: ICD-10-CM

## 2020-02-18 DIAGNOSIS — R41.3 MEMORY LOSS: ICD-10-CM

## 2020-02-18 PROCEDURE — A9552 F18 FDG: HCPCS

## 2020-02-18 RX ORDER — SODIUM CHLORIDE 0.9 % (FLUSH) 0.9 %
10 SYRINGE (ML) INJECTION
Status: COMPLETED | OUTPATIENT
Start: 2020-02-18 | End: 2020-02-18

## 2020-02-18 RX ADMIN — Medication 10 ML: at 07:55

## 2020-02-21 ENCOUNTER — TELEPHONE (OUTPATIENT)
Dept: NEUROLOGY | Age: 85
End: 2020-02-21

## 2020-02-21 NOTE — TELEPHONE ENCOUNTER
----- Message from Fab De Los Santos sent at 2/21/2020  2:41 PM EST -----  Regarding: Dr. Diana Crain  Pt would like a call back regarding her EEG and PET results.  Contact is [phone]

## 2020-03-04 ENCOUNTER — OFFICE VISIT (OUTPATIENT)
Dept: OBGYN CLINIC | Age: 85
End: 2020-03-04

## 2020-03-04 DIAGNOSIS — R10.2 PELVIC PAIN: Primary | ICD-10-CM

## 2020-03-04 DIAGNOSIS — K64.4 EXTERNAL HEMORRHOID: ICD-10-CM

## 2020-03-04 DIAGNOSIS — R19.8 ABDOMINAL FULLNESS: ICD-10-CM

## 2020-03-04 NOTE — PROGRESS NOTES
[facility]  [email or web address]  [phone]    Lo Interiano MD, FACOG       OB/GYN Problem visit    Chief Complaint:   Chief Complaint   Patient presents with    Vaginal Pain       History of Present Illness: This is not a new problem being evaluated by this provider. The patient is a 82 y.o.  female who reports having vaginal pain that did not resolve with taking diflucan. She reports that the pain seems to ache or hurt when standing too long. She reports the symptoms are is unchanged. Aggravating factors include none. Alleviating factors include none. Also has hemorrhoids and ho back problems. She co abd not getting smaller even though she is losing weight, she is worried about cancer. She does not have other concerns. LMP: No LMP recorded. Patient has had a hysterectomy.     PFSH:  Past Medical History:   Diagnosis Date    Anxiety     Aortic valve disorders 2011    Arthritis     Asthma     off and on per pt    Chronic pain     both legs/had 2 ESIs in back/has 2 bad discs in back    Coronary artery disease     Coronary atherosclerosis of native coronary artery 2011    pt unsure of this    Diabetes ([address]     GERD (gastroesophageal reflux disease)     hiatal hernia    Heart failure ([address]     CHF    Hypertension     Ill-defined condition     \"broken tailbone\"    Ill-defined condition     obese per pt    Left heart failure ([address] 2011    Other chest pain 2011    Other ill-defined conditions(799.89)     fibromyalgia    Other ill-defined conditions(799.89)     back pain    Unspecified sleep apnea     does not wear CPAP - minor     Past Surgical History:   Procedure Laterality Date    CARDIAC SURG PROCEDURE UNLIST      aortic valve replacement - cow valve    COLONOSCOPY N/A 10/27/2016    COLONOSCOPY performed by Bronwyn Gottron, MD at [address] [place] [address] ENDOSCOPY    HX APPENDECTOMY      done with cholecystectomy    HX GI      cholecystectomy  HX GYN      hysterectomy (left ovaries), menorrhagia    HX HEENT      tonsillectomy x 1 as of 10/26/2016    HX ORTHOPAEDIC      bilateral partial knee replacements - inside of both knees replaced    HX ORTHOPAEDIC      bilateral trigger finger release - total of 4 - twice on middle finger on each hand    HX OTHER SURGICAL      EGD/colonoscopy x 3 - colon polyp    HX TONSILLECTOMY      HX WISDOM TEETH EXTRACTION      IMPLANT BREAST SILICONE/EQ Bilateral     Removed are 3 different sets/removed - no implants at this time     Family History   Problem Relation Age of Onset    Colon Cancer Mother     Heart Failure Mother     Diabetes Father     Heart Disease Sister         pumping at 10%    Other Sister         aneurysm in heart    Diabetes Sister     Diabetes Daughter      Social History     Tobacco Use    Smoking status: Former Smoker     Last attempt to quit: 8/15/1984     Years since quittin.5    Smokeless tobacco: Never Used    Tobacco comment: former cigarette smoker - smoked for 10 yrs   Substance Use Topics    Alcohol use: No    Drug use: No     Allergies   Allergen Reactions    Codeine Itching    Macrobid [Nitrofurantoin Monohyd/M-Cryst] Other (comments)     Muscle weakness    Pcn [Penicillins] Rash     Itching, facial swelling, pounding heart     Current Outpatient Medications   Medication Sig    escitalopram oxalate (LEXAPRO) 10 mg tablet Take 10 mg by mouth daily.  hydroCHLOROthiazide (HYDRODIURIL) 25 mg tablet Take 25 mg by mouth daily.  glipiZIDE (GLUCOTROL) 5 mg tablet Take 5 mg by mouth daily.  clonazePAM (KLONOPIN) 0.5 mg tablet Take 0.5 mg by mouth nightly as needed.  spironolactone (ALDACTONE) 25 mg tablet Take 25 mg by mouth daily.  ASPIRIN PO Take 81 mg by mouth daily.  meloxicam (MOBIC) 15 mg tablet Take 15 mg by mouth daily.  doxepin (SINEQUAN) 150 mg capsule Take 150 mg by mouth nightly.  MULTIVITAMIN PO Take  by mouth daily.  Takes one po daily. For women over 48.  fluconazole (DIFLUCAN) 150 mg tablet Take 1 Tab by mouth daily.  cholecalciferol (VITAMIN D3) (1000 Units /25 mcg) tablet Take  by mouth daily.  budesonide-formoteroL (SYMBICORT) 160-4.5 mcg/actuation HFAA Take 2 Puffs by inhalation two (2) times a day.  albuterol (VENTOLIN HFA) 90 mcg/actuation inhaler Take  by inhalation. No current facility-administered medications for this visit. Review of Systems:  History obtained from the patient  Constitutional: negative for fevers, chills and weight loss  ENT ROS: negative for - hearing change, oral lesions or visual changes  Respiratory: negative for cough, wheezing or dyspnea on exertion  Cardiovascular: negative for chest pain, irregular heart beats, exertional chest pressure/discomfort  Gastrointestinal: bloating/abd fullness  Genito-Urinary ROS:  see HPI  Inteument/breast: negative for rash, breast lump and nipple discharge  Musculoskeletal: back issues  Endocrine ROS: negative for - breast changes, galactorrhea or temperature intolerance  Hematological and Lymphatic ROS: negative for - blood clots, bruising or swollen lymph nodes    Physical Exam:  There were no vitals taken for this visit. GENERAL: alert, well appearing, and in no distress  HEAD: normocephalic, atraumatic. PULM: clear to auscultation, no wheezes, rales or rhonchi, symmetric air entry   COR: normal rate and regular rhythm, S1 and S2 normal   ABDOMEN: soft, nontender, nondistended, no masses or organomegaly   EGBUS: no lesions, no inflammation, no masses  VULVA: normal appearing vulva with no masses, tenderness or lesions  VAGINA: normal appearing vagina with normal color, no lesions, no discharge  UTERUS: absent  ADNEXA: normal adnexa in size, nontender and no masses, limited by habitus  NEURO: alert, oriented, normal speech  RECTUM: +ext hemorrhoid    Assessment:  Encounter Diagnoses   Name Primary?     Pelvic pain Yes    External hemorrhoid  Abdominal fullness        Plan:  The patient is advised that she should contact the office if she does not note improvement or if symptoms recur  Recommend follow up with PCP for non-gynecologic complaints and chronic medical problems. She should contact our office with any questions or concerns  She could keep her routine annual exam appointment. Disc s/sx vulvar pressure/varicosities and avoiding strain vs hemorrhoid  Trial of otc meds for hemorrhoid: pt defers rx  Disc discomfort may be related to back issues: rec pcp fu or consider PT  Abd us: reviewed prior US : no adnexal masses  Disc s/sx of ov cancer  Rec staying up to date with GI screening    No orders of the defined types were placed in this encounter. No results found for this visit on 03/04/20.

## 2020-03-04 NOTE — PATIENT INSTRUCTIONS

## 2020-03-05 VITALS
BODY MASS INDEX: 28.49 KG/M2 | SYSTOLIC BLOOD PRESSURE: 112 MMHG | DIASTOLIC BLOOD PRESSURE: 62 MMHG | WEIGHT: 171 LBS | HEIGHT: 65 IN

## 2020-05-11 ENCOUNTER — TELEPHONE (OUTPATIENT)
Dept: OBGYN CLINIC | Age: 85
End: 2020-05-11

## 2020-05-11 NOTE — TELEPHONE ENCOUNTER
Message left this afternoon about pelvic pain. 82year old patient last seen in the office on 3/4/2020 for pelvic pain. This nurse called the patient back    Patient is calling to say that she is trying to rescheduling her vaginal ultrasound that was previously cancelled due to covid 19    Patient reports the pelvic discomfort can at times go to 10 on the pain scale of 1-10. Patient denies vaginal bleeding. Please advise when patient can schedule for ultra sound and follow up.

## 2020-05-12 NOTE — TELEPHONE ENCOUNTER
Patient placed on the schedule to be seen for ultrasound at 1:30PM and then 2:00PM for follow up ok per Dr. Deidra Elias. Patient provided with instructions due to covid 19 and advised to wear a mask. Patient verbalized understanding.

## 2020-05-21 PROBLEM — M79.604 PAIN OF RIGHT LOWER EXTREMITY: Status: ACTIVE | Noted: 2017-06-02

## 2020-05-21 PROBLEM — Z90.710 H/O: HYSTERECTOMY: Status: ACTIVE | Noted: 2020-05-21

## 2020-05-21 PROBLEM — M54.50 LOW BACK PAIN: Status: ACTIVE | Noted: 2017-06-02

## 2020-05-22 ENCOUNTER — OFFICE VISIT (OUTPATIENT)
Dept: OBGYN CLINIC | Age: 85
End: 2020-05-22

## 2020-05-22 DIAGNOSIS — R10.2 PELVIC PAIN IN FEMALE: Primary | ICD-10-CM

## 2020-05-22 DIAGNOSIS — N81.10 VAGINAL PROLAPSE: ICD-10-CM

## 2020-05-22 DIAGNOSIS — R93.89 ABNORMAL GENITOURINARY ULTRASOUND: ICD-10-CM

## 2020-05-22 NOTE — PROGRESS NOTES
[address] [facility]  [email or web address]/  [phone]    Fazal Hoover MD, FACOG       OB/GYN Problem visit    Chief Complaint:   Chief Complaint   Patient presents with    Pelvic Pain         History of Present Illness: This is a new problem being evaluated by this provider. The patient is a 82 y.o.  female who reports having pressure sensation in her vagina. Patient describes discomfort as, \" Feels like its going to fall out\" for 2 years. She reports the symptoms has worsened. Aggravating factors include none. Alleviating factors include none. Ho mesh surgery. Not currently SA (partner has issues). Has some arthritis in hands. She does not have other concerns. LMP: No LMP recorded. Patient has had a hysterectomy.     PFSH:  Past Medical History:   Diagnosis Date    Anxiety     Aortic valve disorders 2011    Arthritis     Asthma     off and on per pt    Chronic pain     both legs/had 2 ESIs in back/has 2 bad discs in back    Coronary artery disease     Coronary atherosclerosis of native coronary artery 2011    pt unsure of this    Diabetes ([address]     GERD (gastroesophageal reflux disease)     hiatal hernia    Heart failure ([address]     CHF    Hypertension     Ill-defined condition     \"broken tailbone\"    Ill-defined condition     obese per pt    Left heart failure ([address] 2011    Other chest pain 2011    Other ill-defined conditions(799.89)     fibromyalgia    Other ill-defined conditions(799.89)     back pain    Unspecified sleep apnea     does not wear CPAP - minor     Past Surgical History:   Procedure Laterality Date    CARDIAC SURG PROCEDURE UNLIST      aortic valve replacement - cow valve    COLONOSCOPY N/A 10/27/2016    COLON[facility] Devin [facility] at [facility] ENDOSCOPY    HX APPENDECTOMY      done with cholecystectomy    HX GI      cholecystectomy    HX GYN      hysterectomy (left ovaries), menorrhagia    HX HEENT tonsillectomy x 1 as of 10/26/2016    HX ORTHOPAEDIC      bilateral partial knee replacements - inside of both knees replaced    HX ORTHOPAEDIC      bilateral trigger finger release - total of 4 - twice on middle finger on each hand    HX OTHER SURGICAL      EGD/colonoscopy x 3 - colon polyp    HX TONSILLECTOMY      HX WISDOM TEETH EXTRACTION      IMPLANT BREAST SILICONE/EQ Bilateral     Removed are 3 different sets/removed - no implants at this time     Family History   Problem Relation Age of Onset    Colon Cancer Mother     Heart Failure Mother     Diabetes Father     Heart Disease Sister         pumping at 10%    Other Sister         aneurysm in heart    Diabetes Sister     Diabetes Daughter      Social History     Tobacco Use    Smoking status: Former Smoker     Last attempt to quit: 8/15/1984     Years since quittin.7    Smokeless tobacco: Never Used    Tobacco comment: former cigarette smoker - smoked for 10 yrs   Substance Use Topics    Alcohol use: No    Drug use: No     Allergies   Allergen Reactions    Codeine Itching    Macrobid [Nitrofurantoin Monohyd/M-Cryst] Other (comments)     Muscle weakness    Pcn [Penicillins] Rash     Itching, facial swelling, pounding heart     Current Outpatient Medications   Medication Sig    hydroCHLOROthiazide (HYDRODIURIL) 25 mg tablet Take 25 mg by mouth daily.  cholecalciferol (VITAMIN D3) (1000 Units /25 mcg) tablet Take  by mouth daily.  glipiZIDE (GLUCOTROL) 5 mg tablet Take 5 mg by mouth daily. 2 tabs in morning, 1 tab at bedtime    spironolactone (ALDACTONE) 25 mg tablet Take 25 mg by mouth daily.  ASPIRIN PO Take 81 mg by mouth daily.  meloxicam (MOBIC) 15 mg tablet Take 15 mg by mouth daily.  doxepin (SINEQUAN) 150 mg capsule Take 150 mg by mouth nightly.  MULTIVITAMIN PO Take  by mouth daily. Takes one po daily. For women over 48.  fluconazole (DIFLUCAN) 150 mg tablet Take 1 Tab by mouth daily.      No current facility-administered medications for this visit. Review of Systems:  History obtained from the patient  Constitutional: negative for fevers, chills and weight loss  ENT ROS: negative for - hearing change, oral lesions or visual changes  Respiratory: negative for cough, wheezing or dyspnea on exertion  Cardiovascular: negative for chest pain, irregular heart beats, exertional chest pressure/discomfort  Gastrointestinal: bowel dysfunction: straining/constipation and loose frequent stools  Genito-Urinary ROS:  see HPI  Inteument/breast: negative for rash, breast lump and nipple discharge  Musculoskeletal:negative for stiff joints, neck pain and muscle weakness  Endocrine ROS: negative for - breast changes, galactorrhea or temperature intolerance  Hematological and Lymphatic ROS: negative for - blood clots, bruising or swollen lymph nodes    Physical Exam:  Visit Vitals  /60 (BP 1 Location: Left arm, BP Patient Position: Sitting)   Ht 5' 5\" (1.651 m)   Wt 171 lb (77.6 kg)   BMI 28.46 kg/m²       GENERAL: alert, well appearing, and in no distress  HEAD: normocephalic, atraumatic. PULM: clear to auscultation, no wheezes, rales or rhonchi, symmetric air entry   COR: normal rate and regular rhythm, S1 and S2 normal   ABDOMEN: soft, nontender, nondistended, no masses or organomegaly   EGBUS: no lesions, no inflammation, no masses  VULVA: normal appearing vulva with no masses, tenderness or lesions  VAGINA: atrophic appearing vagina with palor, no lesions, no discharge, post>ant laxity but not to level of introitus  S/p hysterectomy  ADNEXA: normal adnexa in size, nontender and no masses  NEURO: alert, oriented, normal speech    Assessment:  Encounter Diagnoses   Name Primary?     Pelvic pain in female Yes    Abnormal genitourinary ultrasound     Vaginal prolapse    pelvic cyst, stable looking back on prior imaging  GI dysfunction    Plan:  The patient is advised that she should contact the office if she does not note improvement or if symptoms recur  Recommend follow up with PCP for non-gynecologic complaints and chronic medical problems. She should contact our office with any questions or concerns  She could keep her routine annual exam appointment. Reviewed prior CT: pelvic cyst appears stable  We discussed potential causes of lower abdominal/pelvic pain: GYN, GI, , musculoskeletal, infectious process, adhesions, or other etiology  We discussed evaluation of lower abdominal/pelvic pain: including but not limited to observation, surgical evaluation/laparoscopy, imaging   We discussed treatment of lower abdominal/pelvic pain: including but not limited to: pain medication, hormonal management, surgical intervention, bowel regimen. Since pain can be a symptoms of an underlying abnormal process she is encouraged to contact my office with persistent symptoms for additional evaluation and treatment if needed. Rec PT: pt declines  Consider pessary fitting (30min) vs surgical options. Disc would hold on surgical options for now but can consult with urogyn prn: number given for [facility]. Disc pain and prolapse may not be related  rec bowel regimen consider GI fu  Pessary ho, disc option of having cleaning/replacement done in office       XR Results (maximum last 3): Results from [place] encounter on 07/12/19   XR CHEST PA LAT    Narrative Clinical indication: Shortness of breath. Frontal and lateral views of the chest obtained, comparison July 1. Mild  scarring of the lung bases with elevation of the right hemidiaphragm stable  findings. Prior sternotomy, normal size heart no infiltrate. Impression  impression: No acute changes. Results from [place] encounter on 07/01/19   XR CHEST PA LAT    Narrative Chest 2 views dated 7/1/2019    Comparison chest dated 2/7/2019    History is shortness of breath and cough    PA and lateral views of the chest were obtained.  The cardiac silhouette is  normal in size. There is evidence of a previous thoracotomy. There is  hyperaeration of the lungs. There is prominence of the basilar lung markings. Some linear atelectatic change is noted in the region of the right midlung. This  was present at the time of the previous examination. Impression IMPRESSION: Evidence of pulmonary hyperaeration. Presence of minimal linear  atelectatic change involving the right midlung. Results from [place] encounter on 02/07/19   XR CHEST PA LAT    Narrative EXAM:  XR CHEST PA LAT. INDICATION: Shortness of breath. COMPARISON: 11/17/2006. FINDINGS:   PA and lateral radiographs of the chest were obtained. There are now sternal  sutures and a valve replacement. Lungs: The lungs are clear of mass, nodule, airspace disease or edema. Pleura: There is no pleural effusion or pneumothorax. Mediastinum: The cardiac and mediastinal contours and pulmonary vascularity are  normal.  The aorta is mildly tortuous. Bones and soft tissues: There are degenerative changes of the spine. Impression IMPRESSION: Status post median sternotomy. No acute abnormality. CT Results (maximum last 3): Results from [place] encounter on 01/20/20   CT HEAD WO CONT    Narrative Indication:  auditory hallucinations     Comparison: January 2009    Findings: 5 mm axial images were obtained from the skull base through the  vertex. CT dose reduction was achieved through the use of a standardized protocol  tailored for this examination and automatic exposure control for dose  modulation. The ventricles and cortical sulci are prominent, compatible with age related  volume loss. There is no evidence of intracranial hemorrhage, mass, mass effect,  or acute infarct. There is periventricular white matter disease. No extra-axial  fluid collections are seen. The visualized paranasal sinuses and mastoid air  cells are clear. The orbital structures are unremarkable.  No osseous  abnormalities are seen. Impression Impression:   1. No evidence of acute infarct or intracranial hemorrhage. 2. Mild periventricular white matter disease is likely secondary to chronic  small vessel ischemic changes. Results from [facility] encounter on 07/24/17   CT NECK SOFT TISSUE W CONT   Results from Hospital Encounter encounter on 02/17/16   CT ABD PELV WO CONT    Narrative **Final Report**       ICD Codes / Adm. Diagnosis: 625.9  789.09 / Unspecified symptom associated    Abdominal pain, other specif  Examination:  CT ABD PELVIS WO CON  - [id number] - Feb 17 2016  1:22PM  Accession No:  [id number]  Reason:  pelvic pain, abd pain, pelvic mass/cyst      REPORT:  EXAM: CT ABDOMEN AND PELVIS WITHOUT CONTRAST    INDICATION:  Abdominal and pelvic pain with pelvic mass or cyst.    COMPARISON: None. CONTRAST: None. TECHNIQUE:   Unenhanced multislice helical CT was performed from the diaphragm to the   symphysis pubis without or venous contrast administration. Oral contrast was   given. Contiguous 5 mm axial images were reconstructed and lung and soft   tissue windows were generated. Coronal and sagittal reformations were   generated. CT dose reduction was achieved through use of a standardized   protocol tailored for this examination and automatic exposure control for   dose modulation. FINDINGS:  The patient is morbidly obese. The patient is status post median sternotomy   and aortic valve replacement. LOWER CHEST: The visualized portions of the lung bases are clear. The absence of intravenous contrast material reduces the sensitivity for   evaluation of the solid parenchymal organs of the abdomen. ABDOMEN:  Liver: The liver is normal in size and contour with a small low-attenuation   lesion that is too small to characterize. Gallbladder and bile ducts: The gallbladder is absent. There is no   intrahepatic biliary duct dilatation. Spleen: No abnormality. Pancreas: No abnormality. Adrenal glands: No abnormality. Kidneys: No focal parenchymal abnormality. There is no renal or ureteral   calculus or obstruction. PELVIS:  Reproductive organs: The uterus is absent. There is a small 2.7 x 2.6 x 3.1   cm cyst in the pelvic cul-de-sac which may be ovarian related. The ovaries   are not clearly identified. Bladder: No abnormality. RETROPERITONEUM: The aorta is atherosclerotic and tapers without aneurysm. There is no retroperitoneal adenopathy or mass. There is no pelvic mass or   adenopathy    BOWEL AND MESENTERY: The small bowel is normal. The appendix is absent. There are diverticula of the sigmoid colon without evidence of acute   diverticulitis. PERITONEUM: There is no ascites or free intraperitoneal air. BONES AND SOFT TISSUES: There is degenerative disc disease of the lumbar   spine. IMPRESSION:   1. No acute abdominal or pelvic abnormality. 2. Status post hysterectomy. 3. Small pelvic cul-de-sac cyst possibly ovarian. No pelvic mass or   adenopathy. 4. Status post median sternotomy and aortic valve replacement. 5. Atherosclerotic abdominal aorta without aneurysm. 6. Status post cholecystectomy. 7. Status post appendectomy. 8. Diverticulosis. 9. Lumbar spondylosis. Signing/Reading Doctor: Patricia Klein ([id number])    Approved: Patricia Klein ([id number])  Feb 17 2016  1:36PM                                   MRI Results (maximum last 3): Results from [place] encounter on 02/17/20   MRI BRAIN WO CONT    Narrative EXAM:  MRI BRAIN WO CONT  INDICATION:  memory loss, hallucinations, diplopia  TECHNIQUE:  Sagittal T1, axial FLAIR, T2,T1 and gradient echo images as well as coronal T2  weighted images and axial diffusion weighted images of the head were obtained.   Additional orbital imaging was obtained with coronal fat-sat T2 and T1-weighted  images of the orbits as well as thin axial T1-weighted images through the  orbits. COMPARISON:  MRI of the head 9/1/05, CT 1/20/20  FINDINGS:  The ventricular size and configuration are within normal limits for age. Mild punctate foci T2 hyperintensity cerebral white matter without associated  restricted diffusion is nonspecific and also within normal limits for age. No evidence of hemorrhage, mass, infarct or abnormal extra-axial fluid  collections. Flow voids are present in the vertebral basilar and carotid artery systems. The craniocervical junction is unremarkable. Minimal fluid mastoid tip air cells. Structures skull base including paranasal  sinuses are otherwise unremarkable. Impression IMPRESSION: No acute intracranial abnormality demonstrated. Results from [place] encounter on 10/06/14   MRI LUMB SPINE WO CONT    Narrative **Final Report**       ICD Codes / Adm. Diagnosis: 724.4   / Thoracic or lumbosacral neurit    Examination:  MR L SPINE WO CON  - [id number] - Oct  6 2014  3:11PM  Accession No:  [id number]  Reason:  Lumbar radiculopathy      REPORT:  EXAM:  MR L SPINE WO CON  Clinical history: Back pain      INDICATION:  Lumbar radiculopathy    COMPARISON: None    TECHNIQUE: MR imaging of the lumbar spine was performed with sagittal T1,   T2, STIR;  axial T1, T2.    CONTRAST:  None. FINDINGS:    There is normal alignment of the lumbar spine. Vertebral body heights are   maintained. Marrow signal is normal.    The conus medullaris terminates at L1-L2. Signal and caliber of the distal   spinal cord are within normal limits. The paraspinal soft tissues are within normal limits. No fracture or   dislocation. Abdominal aorta normal caliber. Lower thoracic spine: No herniation or stenosis. L1-L2:  No herniation or stenosis. Disc bulge. L2-L3:  No herniation or stenosis. Minimal facet arthropathy. Mild left   foraminal disc protrusion. Spinal canal is patent. Foramina are patent    L3-L4:  Facet arthropathy. Disc bulge.  Mild central canal stenosis. Foramina   are patent    L4-L5:  Facet arthropathy. Mild right paracentral disc protrusion. The   spinal canal is patent. . The foramina are patent    L5-S1:  Disc desiccation. Mild left foraminal disc protrusion. Spinal canal   is patent. Mild subarticular stenosis on the left. IMPRESSION:  Minimal multilevel disc and facet degenerative change. Mild subarticular stenosis on the left at L5-S1. Mild central canal stenosis at L3-L4. Signing/Reading Doctor: Luz Modi ([id number])    ApprovedKatherene Hives ([id number])  Oct  6 2014  3:56PM                                      Nuclear Medicine Results (maximum last 3): Results from Hospital Encounter encounter on 07/12/19   NM LUNG VENT/PERF    Narrative HISTORY:  Shortness of breath    Technique: Ventilation images were obtained in multiple projections following  the inhalation of 40 mCi Tc 99m DTPA. Perfusion images were obtained in multiple  projections following the uneventful intravenous administration of 4 mCi  technetium 99m MAA. FINDINGS: No mismatch perfusion ventilation abnormalities are identified. Impression IMPRESSION: Normal         US Results (maximum last 3): Results from Appointment encounter on 05/22/20   US TRANSVAGINAL    Narrative See impression. Impression Impression: The final result for this exam can be located in this patient's chart with  results from [facility]. Results from Appointment encounter on 08/09/17   US TRANSVAGINAL    Narrative See impression. Impression Impression: The final result for this exam can be located in this patient's chart with  results from [facility]. Results from [place] encounter on 02/04/16   US TRANSVAGINAL    Narrative **Final Report**       ICD Codes / Adm. Diagnosis: 625.9  R10.2 / Unspecified symptom associated    Pelvic and perineal pain  Examination:  US TRANSVAGINAL NON OB  - [id number] - Feb 4 2016  3:00PM  Accession No: [id number]  Reason:  pain      REPORT:  See impression. IMPRESSION:      The final result for this exam can be located in this patient's chart with   results from [facility]. Signing/Reading Doctor: HIREN  ([id number])    Alondra Ca  ([id number])  Feb 4 2016  3:01PM                                  DEXA Results (maximum last 3): No results found for this or any previous visit. CAT Results (maximum last 3): [facility] from [facility] encounter on 08/01/17   CAT MAMMO BI SCREENING INCL CAD    Addendum Addendum: Note: CT examination dated July 13, 2017 from [facility] has been made available for review. This demonstrates enlarged 1.1 cm left subpectoral lymphadenopathy. The location is out of the field of view of mammography. Given the presence of chronic axillary lymph nodes by  mammography, this could reflect the same process. Recommend patient have follow-up CT  scan in 3-6 months. Brady Dalton MD 8/4/2017  8:04 AM          Narrative STUDY: Bilateral digital screening mammogram    INDICATION:  Screening. COMPARISON:  6/11/2013, 10/13/2004, and 5/22/2003. BREAST COMPOSITION:  The breasts are heterogeneously dense, which may obscure  small masses. FINDINGS: Bilateral digital screening mammography was performed and is  interpreted in conjunction with a computer assisted detection (CAD) system. There is a stable and benign appearance to right greater than left axillary  lymph nodes. No suspicious masses or calcifications are identified. There has  been no significant change. Impression IMPRESSION:  BI-RADS 2: Benign. No mammographic evidence of malignancy. RECOMMENDATIONS:  Next screening mammogram is recommended in one year. The patient will be notified of these results. NOTE: The patient has a prior outside CT examination which reported asymmetric  axillary and subpectoral lymph nodes on the left.  We will obtain a release for  the CT examination for direct review and comparison with these mammograms. If  further imaging is thought to be required, we will ask the patient to return for  additional imaging/evaluation. Results from [place] encounter on 06/11/13   CAT MAMMO BI SCREENING DIGTL    Narrative **Final Report**       ICD Codes / Adm. Diagnosis: V76.12   / Other screening mammogram    Examination:  MM MAMMO DIG SCREEN BI  - [id number] - Jun 11 2013  2:58PM  Accession No:  [id number]  Reason:  screening      REPORT:  STUDY:  Bilateral digital screening mammogram    INDICATION:  Screening craniocaudad and MLO views of each breast and a   second left MLO view. COMPARISON:  2004 and 2003    FINDINGS: Bilateral digital screening mammography was performed, and is   interpreted in conjunction with a computer assisted detection (CAD) system. The breast parenchymal pattern is scattered fibroglandular densities. No   new masses or suspicious calcifications are identified. Densities in both   axilla, largest on the right although the largest on the right is smaller   than prior examinations. Tiny density upper outer right breast within   glandular tissue similar to 2004. No malignant-type calcifications. There   is no skin thickening or nipple retraction. IMPRESSION:  No mammographic evidence of malignancy. Stable. Next screening mammogram   is recommended in one year. The patient will be notified of these results. BIRADS 2:  Benign. Signing/Reading Doctor: Cm Márquez ([id number])    Approved: Cm Márquez ([id number])  Jun 11 2013  3:35PM                                  IR Results (maximum last 3): No results found for this or any previous visit. VAS/US Results (maximum last 3): No results found for this or any previous visit. PET Results (maximum last 3):   Results from [place] encounter on 02/18/20   PET BRAIN METABOLIC EVAL (INI)    Narrative PET/CT SCAN    PROCEDURE: Following IV injection of 9.3 mCi 18 Fluoro 2 deoxyglucose (FDG) and  a standard uptake delay, PET imaging is performed of the brain and axial,  sagittal and coronal images were acquired. Unenhanced CT is obtained for  anatomic localization, and attenuation correction of the PET scan. Patient  preprocedure blood glucose level: 158mg/dL. CORRELATIVE IMAGING STUDIES: Brain MR 2/17/2020. PRIOR PET: None    HISTORY: The study is requested for evaluation of memory loss, hallucinations. FINDINGS:    There is normal physiologic tracer distribution. No pattern of abnormal uptake  is identified to suggest an underlying dementia etiology. Noncontrast head CT is  unremarkable. Impression IMPRESSION: No abnormal uptake is identified to suggest an underlying dementia  etiology. No acute abnormality. Physician review of ultrasound performed by technician  UTERUS IS SURGICALLY ABSENT. RIGHT OVARY IS NOT VISUALIZED. RIGHT ADNEXA APPEARS WITHIN NORMAL LIMITS. LEFT OVARY APPEARS TO HAVE A SIMPLE CYST THAT MEASURES 2.2 X 1.7 X 2.2CM. NO FREE FLUID SEEN IN THE PELVIS. Today's ultrasound report and images were reviewed and discussed with the patient. Please see images and imaging report entered by technician in PACS for more detail and progress note and diagnosis entered by MD.    Meryl Cortez MD      No orders of the defined types were placed in this encounter. No results found for this visit on 05/22/20.

## 2020-05-22 NOTE — PATIENT INSTRUCTIONS
Pelvic Pain: Care Instructions Your Care Instructions Pelvic pain, or pain in the lower belly, can have many causes. Often pelvic pain is not serious and gets better in a few days. If your pain continues or gets worse, you may need tests and treatment. Tell your doctor about any new symptoms. These may be signs of a serious problem. Follow-up care is a key part of your treatment and safety. Be sure to make and go to all appointments, and call your doctor if you are having problems. It's also a good idea to know your test results and keep a list of the medicines you take. How can you care for yourself at home? · Rest until you feel better. Lie down, and raise your legs by placing a pillow under your knees. · Drink plenty of fluids. You may find that small, frequent sips are easier on your stomach than if you drink a lot at once. Avoid drinks with carbonation or caffeine, such as soda pop, tea, or coffee. · Try eating several small meals instead of 2 or 3 large ones. Eat mild foods, such as rice, dry toast or crackers, bananas, and applesauce. Avoid fatty and spicy foods, other fruits, and alcohol until 48 hours after your symptoms have gone away. · Take an over-the-counter pain medicine, such as acetaminophen (Tylenol), ibuprofen (Advil, Motrin), or naproxen (Aleve). Read and follow all instructions on the label. · Do not take two or more pain medicines at the same time unless the doctor told you to. Many pain medicines have acetaminophen, which is Tylenol. Too much acetaminophen (Tylenol) can be harmful. · You can put a heating pad, a warm cloth, or moist heat on your belly to relieve pain. When should you call for help? Call your doctor now or seek immediate medical care if: 
  · You have a new or higher fever.  
  · You have unusual vaginal bleeding.  
  · You have new or worse belly or pelvic pain.  
  · You have vaginal discharge that has increased in amount or smells bad.  Watch closely for changes in your health, and be sure to contact your doctor if: 
  · You do not get better as expected. Where can you learn more? Go to http://herminio-los.info/ Enter [phone] in the search box to learn more about \"Pelvic Pain: Care Instructions. \" Current as of: November 7, 2019Content Version: 12.4 © 7260-6810 [employer]. Care instructions adapted under license by [employer] (which disclaims liability or warranty for this information). If you have questions about a medical condition or this instruction, always ask your healthcare professional. [address] any warranty or liability for your use of this information.

## 2020-05-23 VITALS
SYSTOLIC BLOOD PRESSURE: 112 MMHG | HEIGHT: 65 IN | BODY MASS INDEX: 28.49 KG/M2 | WEIGHT: 171 LBS | DIASTOLIC BLOOD PRESSURE: 60 MMHG

## 2020-08-27 ENCOUNTER — HOSPITAL ENCOUNTER (OUTPATIENT)
Dept: CT IMAGING | Age: 85
Discharge: HOME | End: 2020-08-27
Attending: INTERNAL MEDICINE
Payer: MEDICARE

## 2020-08-27 DIAGNOSIS — R11.0 NAUSEA: ICD-10-CM

## 2020-08-27 DIAGNOSIS — R63.4 WEIGHT LOSS: ICD-10-CM

## 2020-08-27 DIAGNOSIS — R10.9 INTRACTABLE ABDOMINAL PAIN: ICD-10-CM

## 2020-08-27 PROCEDURE — 74176 CT ABD & PELVIS W/O CONTRAST: CPT

## 2020-08-27 PROCEDURE — 74011000636 HC RX REV CODE- 636: Performed by: RADIOLOGY

## 2020-08-27 RX ORDER — SODIUM CHLORIDE 0.9 % (FLUSH) 0.9 %
10 SYRINGE (ML) INJECTION
Status: DISCONTINUED | OUTPATIENT
Start: 2020-08-27 | End: 2020-08-27

## 2020-08-27 RX ADMIN — IOHEXOL 50 ML: 240 INJECTION, SOLUTION INTRATHECAL; INTRAVASCULAR; INTRAVENOUS; ORAL at 11:21

## 2020-10-08 ENCOUNTER — TRANSCRIBE ORDER (OUTPATIENT)
Dept: SCHEDULING | Age: 85
End: 2020-10-08

## 2020-10-08 DIAGNOSIS — I70.219 EXTREMITY ATHEROSCLEROSIS WITH INTERMITTENT CLAUDICATION (HCC): Primary | ICD-10-CM

## 2020-10-09 ENCOUNTER — HOSPITAL ENCOUNTER (OUTPATIENT)
Dept: VASCULAR SURGERY | Age: 85
Discharge: HOME | End: 2020-10-09
Attending: INTERNAL MEDICINE
Payer: MEDICARE

## 2020-10-09 ENCOUNTER — TRANSCRIBE ORDER (OUTPATIENT)
Dept: REGISTRATION | Age: 85
End: 2020-10-09

## 2020-10-09 DIAGNOSIS — I70.219 EXTREMITY ATHEROSCLEROSIS WITH INTERMITTENT CLAUDICATION (HCC): ICD-10-CM

## 2020-10-09 DIAGNOSIS — I70.219 EXTREMITY ATHEROSCLEROSIS WITH INTERMITTENT CLAUDICATION (HCC): Primary | ICD-10-CM

## 2020-10-09 PROCEDURE — 93922 UPR/L XTREMITY ART 2 LEVELS: CPT

## 2020-10-10 LAB
LEFT ABI: 0.8
LEFT ANTERIOR TIBIAL: 105 MMHG
LEFT ARM BP: 142 MMHG
LEFT POSTERIOR TIBIAL: 114 MMHG
LEFT TBI: 0.64
LEFT TOE PRESSURE: 91 MMHG
RIGHT ABI: 0.96
RIGHT ANTERIOR TIBIAL: 128 MMHG
RIGHT ARM BP: 132 MMHG
RIGHT POSTERIOR TIBIAL: 136 MMHG
RIGHT TBI: 0.59
RIGHT TOE PRESSURE: 84 MMHG

## 2021-03-10 ENCOUNTER — OFFICE VISIT (OUTPATIENT)
Dept: OBGYN CLINIC | Age: 86
End: 2021-03-10
Payer: MEDICARE

## 2021-03-10 VITALS
DIASTOLIC BLOOD PRESSURE: 60 MMHG | SYSTOLIC BLOOD PRESSURE: 109 MMHG | WEIGHT: 155 LBS | BODY MASS INDEX: 25.83 KG/M2 | HEART RATE: 80 BPM | HEIGHT: 65 IN

## 2021-03-10 DIAGNOSIS — R30.0 DYSURIA: Primary | ICD-10-CM

## 2021-03-10 DIAGNOSIS — N76.2 ACUTE VULVITIS: ICD-10-CM

## 2021-03-10 DIAGNOSIS — R35.0 URINARY FREQUENCY: ICD-10-CM

## 2021-03-10 LAB
BILIRUB UR QL STRIP: NEGATIVE
GLUCOSE UR-MCNC: NEGATIVE MG/DL
KETONES P FAST UR STRIP-MCNC: NEGATIVE MG/DL
PH UR STRIP: 6.5 [PH] (ref 4.6–8)
PROT UR QL STRIP: NEGATIVE
SP GR UR STRIP: 1.02 (ref 1–1.03)
UA UROBILINOGEN AMB POC: NORMAL (ref 0.2–1)
URINALYSIS CLARITY POC: CLEAR
URINALYSIS COLOR POC: YELLOW
URINE BLOOD POC: NEGATIVE
URINE LEUKOCYTES POC: NEGATIVE
URINE NITRITES POC: NEGATIVE

## 2021-03-10 PROCEDURE — G8432 DEP SCR NOT DOC, RNG: HCPCS | Performed by: OBSTETRICS & GYNECOLOGY

## 2021-03-10 PROCEDURE — G8536 NO DOC ELDER MAL SCRN: HCPCS | Performed by: OBSTETRICS & GYNECOLOGY

## 2021-03-10 PROCEDURE — G8427 DOCREV CUR MEDS BY ELIG CLIN: HCPCS | Performed by: OBSTETRICS & GYNECOLOGY

## 2021-03-10 PROCEDURE — 99213 OFFICE O/P EST LOW 20 MIN: CPT | Performed by: OBSTETRICS & GYNECOLOGY

## 2021-03-10 PROCEDURE — 81002 URINALYSIS NONAUTO W/O SCOPE: CPT | Performed by: OBSTETRICS & GYNECOLOGY

## 2021-03-10 PROCEDURE — G8419 CALC BMI OUT NRM PARAM NOF/U: HCPCS | Performed by: OBSTETRICS & GYNECOLOGY

## 2021-03-10 PROCEDURE — G8400 PT W/DXA NO RESULTS DOC: HCPCS | Performed by: OBSTETRICS & GYNECOLOGY

## 2021-03-10 RX ORDER — DOXYCYCLINE 100 MG/1
CAPSULE ORAL
Status: ON HOLD | COMMUNITY
Start: 2021-03-01 | End: 2021-11-30

## 2021-03-10 RX ORDER — CEPHALEXIN 500 MG/1
CAPSULE ORAL
Status: ON HOLD | COMMUNITY
Start: 2021-01-10 | End: 2021-11-30

## 2021-03-10 RX ORDER — TRAMADOL HYDROCHLORIDE 50 MG/1
TABLET ORAL
COMMUNITY
Start: 2021-03-05

## 2021-03-10 RX ORDER — LORAZEPAM 1 MG/1
TABLET ORAL
Status: ON HOLD | COMMUNITY
Start: 2020-12-14 | End: 2021-11-30

## 2021-03-10 RX ORDER — CLONAZEPAM 0.5 MG/1
TABLET ORAL
COMMUNITY
Start: 2021-02-08

## 2021-03-10 RX ORDER — ESCITALOPRAM OXALATE 10 MG/1
TABLET ORAL
Status: ON HOLD | COMMUNITY
Start: 2021-02-17 | End: 2021-11-30

## 2021-03-10 RX ORDER — FLUCONAZOLE 150 MG/1
150 TABLET ORAL DAILY
Qty: 1 TAB | Refills: 0 | Status: ON HOLD | OUTPATIENT
Start: 2021-03-10 | End: 2021-11-30

## 2021-03-10 RX ORDER — CIPROFLOXACIN 250 MG/1
TABLET, FILM COATED ORAL
Status: ON HOLD | COMMUNITY
Start: 2021-01-06 | End: 2021-11-30

## 2021-03-10 RX ORDER — OMEPRAZOLE 40 MG/1
CAPSULE, DELAYED RELEASE ORAL
Status: ON HOLD | COMMUNITY
Start: 2021-01-19 | End: 2021-11-30

## 2021-03-10 RX ORDER — SITAGLIPTIN 50 MG/1
TABLET, FILM COATED ORAL
Status: ON HOLD | COMMUNITY
Start: 2021-03-03 | End: 2021-11-30

## 2021-03-10 NOTE — PROGRESS NOTES
[address] [facility]  [email or web address]  [phone]    Main Matias MD, FACOG       OB/GYN Problem visit    Chief Complaint:   Chief Complaint   Patient presents with    Urinary Frequency         History of Present Illness: This is not a new problem being evaluated by this provider. The patient is a 82 y.o.  female who reports having no energy and feeling \"run down\". At night she has to get up 2 or 3 times to void and it burns when she finishes voiding. Pt reports she started cipro and that made her very sick, then she was on cefalexin in [place]. On 3/1/21 he put her on doxycyclin. Pt reports none of the medications are helping. Denies itching. Denies new partners. Pt reports she either has constipation or diarrhea. She reports the symptoms are is unchanged. Aggravating factors include sitting, voiding. Alleviating factors include baby wipes. She does have other concerns. Pt might want a recommendation for a new PCP. LMP: No LMP recorded. Patient has had a hysterectomy.     PFSH:  Past Medical History:   Diagnosis Date    Anxiety     Aortic valve disorders 2011    Arthritis     Asthma     off and on per pt    Chronic pain     both legs/had 2 ESIs in back/has 2 bad discs in back    Coronary artery disease     Coronary atherosclerosis of native coronary artery 2011    pt unsure of this    Diabetes ([address]     GERD (gastroesophageal reflux disease)     hiatal hernia    Heart failure ([address]     CHF    Hypertension     Ill-defined condition     \"broken tailbone\"    Ill-defined condition     obese per pt    Left heart failure ([address] 2011    Other chest pain 2011    Other ill-defined conditions(799.89)     fibromyalgia    Other ill-defined conditions(799.89)     back pain    Unspecified sleep apnea     does not wear CPAP - minor     Past Surgical History:   Procedure Laterality Date    COLONOSCOPY N/A 10/27/2016    COL[facility] performed by Ricki [facility] at [facility] ENDOSCOPY    HX APPENDECTOMY      done with cholecystectomy    HX GI      cholecystectomy    HX GYN      hysterectomy (left ovaries), menorrhagia    HX HEENT      tonsillectomy x 1 as of 10/26/2016    HX ORTHOPAEDIC      bilateral partial knee replacements - inside of both knees replaced    HX ORTHOPAEDIC      bilateral trigger finger release - total of 4 - twice on middle finger on each hand    HX OTHER SURGICAL      EGD/colonoscopy x 3 - colon polyp    HX TONSILLECTOMY      HX WISDOM TEETH EXTRACTION      IMPLANT BREAST SILICONE/EQ Bilateral     Removed are 3 different sets/removed - no implants at this time    PA CARDIAC SURG PROCEDURE UNLIST  2013    aortic valve replacement - cow valve     Family History   Problem Relation Age of Onset    Colon Cancer Mother     Heart Failure Mother     Diabetes Father     Heart Disease Sister         pumping at 10%    Other Sister         aneurysm in heart    Diabetes Sister     Diabetes Daughter      Social History     Tobacco Use    Smoking status: Former Smoker     Quit date: 8/15/1984     Years since quittin.5    Smokeless tobacco: Never Used    Tobacco comment: former cigarette smoker - smoked for 10 yrs   Substance Use Topics    Alcohol use: No    Drug use: No     Allergies   Allergen Reactions    Codeine Itching    Buprenorphine Hcl Unknown (comments)    Escitalopram Oxalate Unknown (comments)    Nitrofurantoin Monohyd/M-Cryst Other (comments) and Unknown (comments)     Muscle weakness    Pcn [Penicillins] Rash     Itching, facial swelling, pounding heart     Current Outpatient Medications   Medication Sig    clonazePAM (KlonoPIN) 0.5 mg tablet TAKE 1 TABLET BY MOUTH TWICE A DAY    escitalopram oxalate (LEXAPRO) 10 mg tablet TAKE 1 TABLET BY MOUTH IN THE MORNING    omeprazole (PRILOSEC) 40 mg capsule     Januvia 50 mg tablet 1 TABLET ONCE A DAY FOR DIABETES ORALLY 30 DAYS    traMADoL (ULTRAM) 50 mg tablet TAKE 1 TABLET BY MOUTH TWICE A DAY    fluconazole (Diflucan) 150 mg tablet Take 1 Tab by mouth daily.  hydroCHLOROthiazide (HYDRODIURIL) 25 mg tablet Take 25 mg by mouth daily.  glipiZIDE (GLUCOTROL) 5 mg tablet Take 5 mg by mouth daily. 2 tabs in morning, 1 tab at bedtime    spironolactone (ALDACTONE) 25 mg tablet Take 25 mg by mouth daily.  ASPIRIN PO Take 81 mg by mouth daily.  meloxicam (MOBIC) 15 mg tablet Take 15 mg by mouth daily.  MULTIVITAMIN PO Take  by mouth daily. Takes one po daily. For women over 48.  cephALEXin (KEFLEX) 500 mg capsule TAKE 1 CAPSULE BY MOUTH TWICE DAILY UNTIL GONE    ciprofloxacin HCl (CIPRO) 250 mg tablet TAKE 1 TABLET BY MOUTH TWICE DAILY    doxycycline (VIBRAMYCIN) 100 mg capsule TAKE 1 CAPSULE BY MOUTH TWICE DAILY    LORazepam (ATIVAN) 1 mg tablet TAKE 1 TABLET BY MOUTH EVERY DAY AT BEDTIME AS NEEDED    fluconazole (DIFLUCAN) 150 mg tablet Take 1 Tab by mouth daily.  cholecalciferol (VITAMIN D3) (1000 Units /25 mcg) tablet Take  by mouth daily.  doxepin (SINEQUAN) 150 mg capsule Take 150 mg by mouth nightly. No current facility-administered medications for this visit.         Review of Systems:  History obtained from the patient  Constitutional: negative for fevers, chills and weight loss  ENT ROS: negative for - hearing change, oral lesions or visual changes  Respiratory: negative for cough, wheezing or dyspnea on exertion  Cardiovascular: negative for chest pain, irregular heart beats, exertional chest pressure/discomfort  Gastrointestinal: negative for dysphagia, nausea and vomiting  Genito-Urinary ROS:  see HPI  Inteument/breast: negative for rash, breast lump and nipple discharge  Musculoskeletal:negative for stiff joints, neck pain and muscle weakness  Endocrine ROS: negative for - breast changes, galactorrhea or temperature intolerance  Hematological and Lymphatic ROS: negative for - blood clots, bruising or swollen lymph nodes    Physical Exam:  Visit Vitals  /60 (BP 1 Location: Right arm, BP Patient Position: Sitting, BP Cuff Size: Adult)   Pulse 80   Ht 5' 5\" (1.651 m)   Wt 155 lb (70.3 kg)   BMI 25.79 kg/m²       GENERAL: alert, well appearing, and in no distress  HEAD: normocephalic, atraumatic. ABDOMEN: soft, nontender, nondistended, no masses or organomegaly   EGBUS: no lesions, no inflammation, no masses  VULVA: normal appearing vulva with no masses, tenderness or lesions  VAGINA: normal appearing vagina with normal color, no lesions, white and thin discharge  CERVIX: normal appearing cervix without discharge or lesions, non tender  UTERUS: absent   NEURO: alert, oriented, normal speech    Assessment:  Encounter Diagnoses   Name Primary?  Dysuria Yes    Urinary frequency     Acute vulvitis        Plan:  The patient is advised that she should contact the office if she does not note improvement or if symptoms recur  Recommend follow up with PCP for non-gynecologic complaints and chronic medical problems. She should contact our office with any questions or concerns  She could keep her routine annual exam appointment.    Ur cx hold on abx  Diflucan   Notify MD if NI  ? [facility] if ur cx neg      Orders Placed This Encounter    CULTURE, URINE    AMB POC URINALYSIS DIP STICK MANUAL W/O MICRO    fluconazole (Diflucan) 150 mg tablet       Results for orders placed or performed in visit on 03/10/21   AMB POC URINALYSIS DIP STICK MANUAL W/O MICRO   Result Value Ref Range    Color (UA POC) Yellow     Clarity (UA POC) Clear     Glucose (UA POC) Negative Negative    Bilirubin (UA POC) Negative Negative    Ketones (UA POC) Negative Negative    Specific gravity (UA POC) 1.025 1.001 - 1.035    Blood (UA POC) Negative Negative    pH (UA POC) 6.5 4.6 - 8.0    Protein (UA POC) Negative Negative    Urobilinogen (UA POC) normal 0.2 - 1    Nitrites (UA POC) Negative Negative    Leukocyte esterase (UA POC) Negative Negative

## 2021-03-10 NOTE — PATIENT INSTRUCTIONS
Urinary Tract Infection in Women: Care Instructions Your Care Instructions A urinary tract infection, or UTI, is a general term for an infection anywhere between the kidneys and the urethra (where urine comes out). Most UTIs are bladder infections. They often cause pain or burning when you urinate. UTIs are caused by bacteria and can be cured with antibiotics. Be sure to complete your treatment so that the infection goes away. Follow-up care is a key part of your treatment and safety. Be sure to make and go to all appointments, and call your doctor if you are having problems. It's also a good idea to know your test results and keep a list of the medicines you take. How can you care for yourself at home? · Take your antibiotics as directed. Do not stop taking them just because you feel better. You need to take the full course of antibiotics. · Drink extra water and other fluids for the next day or two. This may help wash out the bacteria that are causing the infection. (If you have kidney, heart, or liver disease and have to limit fluids, talk with your doctor before you increase your fluid intake.) · Avoid drinks that are carbonated or have caffeine. They can irritate the bladder. · Urinate often. Try to empty your bladder each time. · To relieve pain, take a hot bath or lay a heating pad set on low over your lower belly or genital area. Never go to sleep with a heating pad in place. To prevent UTIs · Drink plenty of water each day. This helps you urinate often, which clears bacteria from your system. (If you have kidney, heart, or liver disease and have to limit fluids, talk with your doctor before you increase your fluid intake.) · Urinate when you need to. · Urinate right after you have sex. · Change sanitary pads often. · Avoid douches, bubble baths, feminine hygiene sprays, and other feminine hygiene products that have deodorants. · After going to the bathroom, wipe from front to back. When should you call for help? Call your doctor now or seek immediate medical care if: 
  · Symptoms such as fever, chills, nausea, or vomiting get worse or appear for the first time.  
  · You have new pain in your back just below your rib cage. This is called flank pain.  
  · There is new blood or pus in your urine.  
  · You have any problems with your antibiotic medicine. Watch closely for changes in your health, and be sure to contact your doctor if: 
  · You are not getting better after taking an antibiotic for 2 days.  
  · Your symptoms go away but then come back. Where can you learn more? Go to http://www.gray.com/ Enter T423 in the search box to learn more about \"Urinary Tract Infection in Women: Care Instructions. \" Current as of: June 29, 2020               Content Version: 12.6 © 8973-3093 [employer]. Care instructions adapted under license by [employer] (which disclaims liability or warranty for this information). If you have questions about a medical condition or this instruction, always ask your healthcare professional. [address] any warranty or liability for your use of this information.

## 2021-03-12 LAB
BACTERIA SPEC CULT: NORMAL
SERVICE CMNT-IMP: NORMAL

## 2021-03-13 NOTE — PROGRESS NOTES
The results are normal.   Please notify patient if KSKT message not read or not active. Recommend f/u if still having symptoms/problems or has additional concerns.

## 2021-08-16 ENCOUNTER — HOSPITAL ENCOUNTER (OUTPATIENT)
Dept: CT IMAGING | Age: 86
Discharge: HOME | End: 2021-08-16
Payer: MEDICARE

## 2021-08-16 ENCOUNTER — TRANSCRIBE ORDER (OUTPATIENT)
Dept: SCHEDULING | Age: 86
End: 2021-08-16

## 2021-08-16 DIAGNOSIS — R10.30 LOWER ABDOMINAL PAIN: Primary | ICD-10-CM

## 2021-08-16 DIAGNOSIS — R10.30 LOWER ABDOMINAL PAIN: ICD-10-CM

## 2021-08-16 DIAGNOSIS — R14.2 BELCHING: Primary | ICD-10-CM

## 2021-08-16 DIAGNOSIS — E11.9 DIABETES MELLITUS (HCC): ICD-10-CM

## 2021-08-16 LAB — CREAT BLD-MCNC: 1.5 MG/DL (ref 0.6–1.3)

## 2021-08-16 PROCEDURE — 82565 ASSAY OF CREATININE: CPT | Performed by: INTERNAL MEDICINE

## 2021-08-16 PROCEDURE — 74176 CT ABD & PELVIS W/O CONTRAST: CPT | Performed by: INTERNAL MEDICINE

## 2021-08-25 ENCOUNTER — OFFICE VISIT (OUTPATIENT)
Dept: OBGYN CLINIC | Age: 86
End: 2021-08-25

## 2021-08-25 VITALS
HEIGHT: 65 IN | HEART RATE: 74 BPM | SYSTOLIC BLOOD PRESSURE: 108 MMHG | WEIGHT: 149.8 LBS | BODY MASS INDEX: 24.96 KG/M2 | DIASTOLIC BLOOD PRESSURE: 76 MMHG

## 2021-08-25 DIAGNOSIS — Z90.710 H/O: HYSTERECTOMY: ICD-10-CM

## 2021-08-25 DIAGNOSIS — N83.202 CYST OF LEFT OVARY: ICD-10-CM

## 2021-08-25 DIAGNOSIS — R10.2 PELVIC PAIN IN FEMALE: Primary | ICD-10-CM

## 2021-08-25 PROCEDURE — G8427 DOCREV CUR MEDS BY ELIG CLIN: HCPCS | Performed by: OBSTETRICS & GYNECOLOGY

## 2021-08-25 PROCEDURE — G8420 CALC BMI NORM PARAMETERS: HCPCS | Performed by: OBSTETRICS & GYNECOLOGY

## 2021-08-25 PROCEDURE — 99213 OFFICE O/P EST LOW 20 MIN: CPT | Performed by: OBSTETRICS & GYNECOLOGY

## 2021-08-25 PROCEDURE — G8400 PT W/DXA NO RESULTS DOC: HCPCS | Performed by: OBSTETRICS & GYNECOLOGY

## 2021-08-25 PROCEDURE — 1101F PT FALLS ASSESS-DOCD LE1/YR: CPT | Performed by: OBSTETRICS & GYNECOLOGY

## 2021-08-25 PROCEDURE — G8432 DEP SCR NOT DOC, RNG: HCPCS | Performed by: OBSTETRICS & GYNECOLOGY

## 2021-08-25 PROCEDURE — 1090F PRES/ABSN URINE INCON ASSESS: CPT | Performed by: OBSTETRICS & GYNECOLOGY

## 2021-08-25 PROCEDURE — G8536 NO DOC ELDER MAL SCRN: HCPCS | Performed by: OBSTETRICS & GYNECOLOGY

## 2021-08-25 RX ORDER — BUDESONIDE AND FORMOTEROL FUMARATE DIHYDRATE 160; 4.5 UG/1; UG/1
2 AEROSOL RESPIRATORY (INHALATION)
COMMUNITY

## 2021-08-25 NOTE — PROGRESS NOTES
[facility]  [email or web address]  [phone]    Kanika Buckner MD, FACOG       OB/GYN Problem visit    Chief Complaint:   Chief Complaint   Patient presents with    Abdominal Pain    Ultrasound       Last or next WWE is: due    Ultrasound:  TRANSVAGINAL ULTRASOUND PERFORMED  UTERUS IS SURGICALLY ABSENT. RIGHT OVARY IS NOT VISUALIZED. RIGHT ADNEXA APPEARS WITHIN NORMAL LIMITS. LEFT OVARY APPEARS TO HAVE A PERSISTANT SIMPLE CYST THAT MEASURES 23 X 13 X 17MM. LITTLE TO NO  CHANGE IS SEEN FROM THE PREVIOUS ULTRASOUND DONE ON 2020. NO FREE FLUID SEEN IN THE PELVIS. History of Present Illness: This is a new problem being evaluated by this provider. The patient is a 82 y.o.  female who reports having abdominal pain for the past few weeks. The pain is along the RLQ & LLQ & at the top of her abdomen. She reports the symptoms are has worsened. Aggravating factors include none. Alleviating factors include none.  + loose stools2    She does not have other concerns. LMP: No LMP recorded. Patient has had a hysterectomy.     PFSH:  Past Medical History:   Diagnosis Date    Anxiety     Aortic valve disorders 2011    Arthritis     Asthma     off and on per pt    Chronic pain     both legs/had 2 ESIs in back/has 2 bad discs in back    Coronary artery disease     Coronary atherosclerosis of native coronary artery 2011    pt unsure of this    Diabetes ([address]     GERD (gastroesophageal reflux disease)     hiatal hernia    Heart failure ([address]     CHF    Hypertension     Ill-defined condition     \"broken tailbone\"    Ill-defined condition     obese per pt    Left heart failure ([address] 2011    Other chest pain 2011    Other ill-defined conditions(799.89)     fibromyalgia    Other ill-defined conditions(799.89)     back pain    Unspecified sleep apnea     does not wear CPAP - minor     Past Surgical History:   Procedure Laterality Date    COLONOSCOPY N/A 10/27/2016    COLONOSCOPY performed by Bolivar Velazquez MD at [address] [place] [address] ENDOSCOPY    HX APPENDECTOMY      done with cholecystectomy    HX GI      cholecystectomy    HX GYN      hysterectomy (left ovaries), menorrhagia    HX HEENT      tonsillectomy x 1 as of 10/26/2016    HX ORTHOPAEDIC      bilateral partial knee replacements - inside of both knees replaced    HX ORTHOPAEDIC      bilateral trigger finger release - total of 4 - twice on middle finger on each hand    HX OTHER SURGICAL      EGD/colonoscopy x 3 - colon polyp    HX TONSILLECTOMY      HX WISDOM TEETH EXTRACTION      IMPLANT BREAST SILICONE/EQ Bilateral     Removed are 3 different sets/removed - no implants at this time    OR CARDIAC SURG PROCEDURE UNLIST  2013    aortic valve replacement - cow valve     Family History   Problem Relation Age of Onset    Colon Cancer Mother     Heart Failure Mother     Diabetes Father     Heart Disease Sister         pumping at 10%    Other Sister         aneurysm in heart    Diabetes Sister     Diabetes Daughter      Social History     Tobacco Use    Smoking status: Former Smoker     Quit date: 8/15/1984     Years since quittin.0    Smokeless tobacco: Never Used    Tobacco comment: former cigarette smoker - smoked for 10 yrs   Substance Use Topics    Alcohol use: No    Drug use: No     Allergies   Allergen Reactions    Codeine Itching    Buprenorphine Hcl Unknown (comments)    Escitalopram Oxalate Unknown (comments)    Nitrofurantoin Monohyd/M-Cryst Other (comments) and Unknown (comments)     Muscle weakness    Pcn [Penicillins] Rash     Itching, facial swelling, pounding heart     Current Outpatient Medications   Medication Sig    budesonide-formoteroL (Symbicort) 160-4.5 mcg/actuation HFAA Take 2 Puffs by inhalation.     clonazePAM (KlonoPIN) 0.5 mg tablet TAKE 1 TABLET BY MOUTH TWICE A DAY    LORazepam (ATIVAN) 1 mg tablet TAKE 1 TABLET BY MOUTH EVERY DAY AT BEDTIME AS NEEDED  omeprazole (PRILOSEC) 40 mg capsule     Januvia 50 mg tablet 1 TABLET ONCE A DAY FOR DIABETES ORALLY 30 DAYS    traMADoL (ULTRAM) 50 mg tablet TAKE 1 TABLET BY MOUTH TWICE A DAY    hydroCHLOROthiazide (HYDRODIURIL) 25 mg tablet Take 25 mg by mouth daily.  cholecalciferol (VITAMIN D3) (1000 Units /25 mcg) tablet Take  by mouth daily.  glipiZIDE (GLUCOTROL) 5 mg tablet Take 5 mg by mouth daily. 2 tabs in morning, 1 tab at bedtime    spironolactone (ALDACTONE) 25 mg tablet Take 25 mg by mouth daily.  ASPIRIN PO Take 81 mg by mouth daily.  MULTIVITAMIN PO Take  by mouth daily. Takes one po daily. For women over 48.  coenzyme q10 75 mg cap     cephALEXin (KEFLEX) 500 mg capsule TAKE 1 CAPSULE BY MOUTH TWICE DAILY UNTIL GONE (Patient not taking: Reported on 8/25/2021)    ciprofloxacin HCl (CIPRO) 250 mg tablet TAKE 1 TABLET BY MOUTH TWICE DAILY (Patient not taking: Reported on 8/25/2021)    doxycycline (VIBRAMYCIN) 100 mg capsule TAKE 1 CAPSULE BY MOUTH TWICE DAILY (Patient not taking: Reported on 8/25/2021)    escitalopram oxalate (LEXAPRO) 10 mg tablet TAKE 1 TABLET BY MOUTH IN THE MORNING (Patient not taking: Reported on 8/25/2021)    fluconazole (Diflucan) 150 mg tablet Take 1 Tab by mouth daily. (Patient not taking: Reported on 8/25/2021)    fluconazole (DIFLUCAN) 150 mg tablet Take 1 Tab by mouth daily. (Patient not taking: Reported on 8/25/2021)    meloxicam (MOBIC) 15 mg tablet Take 15 mg by mouth daily. (Patient not taking: Reported on 8/25/2021)    doxepin (SINEQUAN) 150 mg capsule Take 150 mg by mouth nightly. (Patient not taking: Reported on 8/25/2021)     No current facility-administered medications for this visit.        Review of Systems:  History obtained from the patient  Constitutional:  Weight loss 40 pounds x December  ENT ROS: negative for - hearing change, oral lesions or visual changes  Respiratory: negative for cough, wheezing or dyspnea on exertion  Cardiovascular: negative for chest pain, irregular heart beats, exertional chest pressure/discomfort  Gastrointestinal: see HPI  Genito-Urinary ROS:  see HPI  Inteument/breast: negative for rash, breast lump and nipple discharge  Musculoskeletal:negative for stiff joints, neck pain and muscle weakness  Endocrine ROS: negative for - breast changes, galactorrhea or temperature intolerance  Hematological and Lymphatic ROS: negative for - blood clots, bruising or swollen lymph nodes    Physical Exam:  Visit Vitals  /76 (BP 1 Location: Right arm)   Pulse 74   Ht 5' 5\" (1.651 m)   Wt 149 lb 12.8 oz (67.9 kg)   BMI 24.93 kg/m²       GENERAL: alert, well appearing, and in no distress  HEAD: normocephalic, atraumatic. PULM: clear to auscultation, no wheezes, rales or rhonchi, symmetric air entry   COR: normal rate and regular rhythm, S1 and S2 normal   ABDOMEN: soft, nontender, nondistended, no masses or organomegaly   EGBUS: no lesions, no inflammation, no masses, atrophic changes  VULVA: normal appearing vulva with no masses, tenderness or lesions  VAGINA: normal appearing vagina with normal color, no lesions, no discharge  CERVIX: absent  UTERUS: absent  ADNEXA: normal adnexa in size, nontender and no masses  NEURO: alert, oriented, normal speech    Assessment:  Encounter Diagnoses   Name Primary?  Pelvic pain in female Yes    H/O: hysterectomy     Cyst of left ovary        Plan:  The patient is advised that she should contact the office if she does not note improvement or if symptoms recur  Recommend follow up with PCP for non-gynecologic complaints and chronic medical problems. She should contact our office with any questions or concerns  She could keep her routine annual exam appointment.    We discussed potential causes of lower abdominal/pelvic pain: GYN, GI, , musculoskeletal, infectious process, adhesions, or other etiology  We discussed evaluation of lower abdominal/pelvic pain: including but not limited to observation, surgical evaluation/laparoscopy, imaging   We discussed treatment of lower abdominal/pelvic pain: including but not limited to: pain medication, hormonal management, surgical intervention, bowel regimen. Since pain can be a symptoms of an underlying abnormal process she is encouraged to contact my office with persistent symptoms for additional evaluation and treatment if needed. Discussed typical course of benign ovarian cyst vs s/sx ovarian carcinoma. Notify MD for bowel changes/pain/bloating/early satiety. Discussed hormonal options vs surgical options vs observation. Plan: observe, no change x 1 year  Disc s/sx of ovarian cancer and disc that this small simple stable cyst is unlikely related to her fatigue, weight loss and pain but we can remove if concerning s/sx of ovarian cancer  Rec PCP fu (has seen GI but does not have PCP)  Copy of US for pt to take to PCP    On this date, 8/25/2021,  I have spent 20 minutes reviewing previous notes, test results and face to face with the patient discussing the diagnosis and importance of compliance with the treatment plan as well as documenting on the day of the visit. Physician review of ultrasound performed by technician    Today's ultrasound report and images were reviewed and discussed with the patient. Please see images and imaging report entered by technician in PACS for more detail and progress note and diagnosis entered by MD.    Star Libman, MD      No orders of the defined types were placed in this encounter. No results found for this visit on 08/25/21.

## 2021-09-30 ENCOUNTER — HOSPITAL ENCOUNTER (EMERGENCY)
Dept: GENERAL RADIOLOGY | Age: 86
Discharge: HOME | End: 2021-09-30
Attending: EMERGENCY MEDICINE
Payer: MEDICARE

## 2021-09-30 ENCOUNTER — HOSPITAL ENCOUNTER (EMERGENCY)
Age: 86
Discharge: HOME | End: 2021-09-30
Attending: EMERGENCY MEDICINE | Admitting: EMERGENCY MEDICINE
Payer: MEDICARE

## 2021-09-30 ENCOUNTER — APPOINTMENT (OUTPATIENT)
Dept: CT IMAGING | Age: 86
End: 2021-09-30
Attending: EMERGENCY MEDICINE
Payer: MEDICARE

## 2021-09-30 VITALS
WEIGHT: 141 LBS | HEIGHT: 64 IN | SYSTOLIC BLOOD PRESSURE: 134 MMHG | OXYGEN SATURATION: 100 % | HEART RATE: 65 BPM | RESPIRATION RATE: 100 BRPM | BODY MASS INDEX: 24.07 KG/M2 | DIASTOLIC BLOOD PRESSURE: 57 MMHG | TEMPERATURE: 98.1 F

## 2021-09-30 DIAGNOSIS — S20.221A CONTUSION OF RIGHT SIDE OF BACK, INITIAL ENCOUNTER: Primary | ICD-10-CM

## 2021-09-30 LAB
ALBUMIN SERPL-MCNC: 3.5 G/DL (ref 3.5–5)
ALBUMIN/GLOB SERPL: 1 {RATIO} (ref 1.1–2.2)
ALP SERPL-CCNC: 58 U/L (ref 45–117)
ALT SERPL-CCNC: 25 U/L (ref 12–78)
ANION GAP SERPL CALC-SCNC: 8 MMOL/L (ref 5–15)
AST SERPL-CCNC: 23 U/L (ref 15–37)
BASOPHILS # BLD: 0 K/UL (ref 0–0.1)
BASOPHILS NFR BLD: 0 % (ref 0–1)
BILIRUB SERPL-MCNC: 0.6 MG/DL (ref 0.2–1)
BUN SERPL-MCNC: 26 MG/DL (ref 6–20)
BUN/CREAT SERPL: 15 (ref 12–20)
CALCIUM SERPL-MCNC: 9.5 MG/DL (ref 8.5–10.1)
CHLORIDE SERPL-SCNC: 104 MMOL/L (ref 97–108)
CO2 SERPL-SCNC: 24 MMOL/L (ref 21–32)
CREAT SERPL-MCNC: 1.69 MG/DL (ref 0.55–1.02)
DIFFERENTIAL METHOD BLD: ABNORMAL
EOSINOPHIL # BLD: 0.2 K/UL (ref 0–0.4)
EOSINOPHIL NFR BLD: 2 % (ref 0–7)
ERYTHROCYTE [DISTWIDTH] IN BLOOD BY AUTOMATED COUNT: 11.9 % (ref 11.5–14.5)
GLOBULIN SER CALC-MCNC: 3.6 G/DL (ref 2–4)
GLUCOSE SERPL-MCNC: 116 MG/DL (ref 65–100)
HCT VFR BLD AUTO: 45.7 % (ref 35–47)
HGB BLD-MCNC: 16.1 G/DL (ref 11.5–16)
IMM GRANULOCYTES # BLD AUTO: 0 K/UL (ref 0–0.04)
IMM GRANULOCYTES NFR BLD AUTO: 0 % (ref 0–0.5)
LYMPHOCYTES # BLD: 1.9 K/UL (ref 0.8–3.5)
LYMPHOCYTES NFR BLD: 19 % (ref 12–49)
MCH RBC QN AUTO: 32.1 PG (ref 26–34)
MCHC RBC AUTO-ENTMCNC: 35.2 G/DL (ref 30–36.5)
MCV RBC AUTO: 91 FL (ref 80–99)
MONOCYTES # BLD: 0.7 K/UL (ref 0–1)
MONOCYTES NFR BLD: 7 % (ref 5–13)
NEUTS SEG # BLD: 7.2 K/UL (ref 1.8–8)
NEUTS SEG NFR BLD: 72 % (ref 32–75)
NRBC # BLD: 0 K/UL (ref 0–0.01)
NRBC BLD-RTO: 0 PER 100 WBC
PLATELET # BLD AUTO: 302 K/UL (ref 150–400)
PMV BLD AUTO: 10.6 FL (ref 8.9–12.9)
POTASSIUM SERPL-SCNC: 4 MMOL/L (ref 3.5–5.1)
PROT SERPL-MCNC: 7.1 G/DL (ref 6.4–8.2)
RBC # BLD AUTO: 5.02 M/UL (ref 3.8–5.2)
SODIUM SERPL-SCNC: 136 MMOL/L (ref 136–145)
WBC # BLD AUTO: 10 K/UL (ref 3.6–11)

## 2021-09-30 PROCEDURE — 74011250637 HC RX REV CODE- 250/637: Performed by: EMERGENCY MEDICINE

## 2021-09-30 PROCEDURE — 73502 X-RAY EXAM HIP UNI 2-3 VIEWS: CPT

## 2021-09-30 PROCEDURE — 72192 CT PELVIS W/O DYE: CPT

## 2021-09-30 PROCEDURE — 74011250636 HC RX REV CODE- 250/636: Performed by: EMERGENCY MEDICINE

## 2021-09-30 PROCEDURE — 85025 COMPLETE CBC W/AUTO DIFF WBC: CPT

## 2021-09-30 PROCEDURE — 80053 COMPREHEN METABOLIC PANEL: CPT

## 2021-09-30 PROCEDURE — 96374 THER/PROPH/DIAG INJ IV PUSH: CPT

## 2021-09-30 PROCEDURE — 72100 X-RAY EXAM L-S SPINE 2/3 VWS: CPT

## 2021-09-30 PROCEDURE — 99284 EMERGENCY DEPT VISIT MOD MDM: CPT

## 2021-09-30 PROCEDURE — 36415 COLL VENOUS BLD VENIPUNCTURE: CPT

## 2021-09-30 RX ORDER — ACETAMINOPHEN 325 MG/1
650 TABLET ORAL
Status: COMPLETED | OUTPATIENT
Start: 2021-09-30 | End: 2021-09-30

## 2021-09-30 RX ORDER — MORPHINE SULFATE 2 MG/ML
2 INJECTION, SOLUTION INTRAMUSCULAR; INTRAVENOUS
Status: COMPLETED | OUTPATIENT
Start: 2021-09-30 | End: 2021-09-30

## 2021-09-30 RX ADMIN — MORPHINE SULFATE 2 MG: 2 INJECTION, SOLUTION INTRAMUSCULAR; INTRAVENOUS at 15:30

## 2021-09-30 RX ADMIN — ACETAMINOPHEN 650 MG: 325 TABLET ORAL at 16:41

## 2021-09-30 NOTE — ED TRIAGE NOTES
Pt arrives via EMS from home with complaint of a fall at 0930. Pt states she was stepping up through a doorway, missed placed her foot and lost her balance and fell. Pt denies hitting head or LOC. Pt is complaining right sided mid/lower back pain and into right hip. Pt does not take blood thinners.

## 2021-09-30 NOTE — ED PROVIDER NOTES
Ms. Jim Arthur is an 83yo female who presents to the ER with complaints of low back/right side pain after GLF. She states that she feel at approximately 9am.  She states that she was at a facility doing puppy training with her dog. She missed her step and fell while walking through a doorway. She did not hit her head. NO LOC. She states that she landed on her left side. She reports signficant pain at her right lower back/pelvis. No changes with her urine or bowel movements. No numbness/tingling or weakness. She states that has had some difficulty walking due to pain. Her  has helped her get the the bathroom once, but that was with great difficulty. She says that she felt at her normal state of health prior to the fall. She denies any other complaints.             Past Medical History:   Diagnosis Date    Anxiety     Aortic valve disorders 4/7/2011    Arthritis     Asthma     off and on per pt    Chronic pain     both legs/had 2 ESIs in back/has 2 bad discs in back    Coronary artery disease     Coronary atherosclerosis of native coronary artery 4/7/2011    pt unsure of this    Diabetes ([address]     GERD (gastroesophageal reflux disease)     hiatal hernia    Heart failure ([address]     CHF    Hypertension     Ill-defined condition     \"broken tailbone\"    Ill-defined condition     obese per pt    Left heart failure ([address] 4/7/2011    Other chest pain 4/7/2011    Other ill-defined conditions(799.89)     fibromyalgia    Other ill-defined conditions(799.89)     back pain    Unspecified sleep apnea     does not wear CPAP - minor       Past Surgical History:   Procedure Laterality Date    COLONOSCOPY N/A 10/27/2016    COLONOS[facility] performed by Belen Rodarte MD at [facility] ENDOSCOPY    HX APPENDECTOMY      done with cholecystectomy    HX GI      cholecystectomy    HX GYN      hysterectomy (left ovaries), menorrhagia    HX HEENT      tonsillectomy x 1 as of 10/26/2016    HX ORTHOPAEDIC      bilateral partial knee replacements - inside of both knees replaced    HX ORTHOPAEDIC      bilateral trigger finger release - total of 4 - twice on middle finger on each hand    HX OTHER SURGICAL      EGD/colonoscopy x 3 - colon polyp    HX TONSILLECTOMY      HX WISDOM TEETH EXTRACTION      IMPLANT BREAST SILICONE/EQ Bilateral     Removed are 3 different sets/removed - no implants at this time    TX CARDIAC SURG PROCEDURE UNLIST      aortic valve replacement - cow valve         Family History:   Problem Relation Age of Onset    Colon Cancer Mother     Heart Failure Mother     Diabetes Father     Heart Disease Sister         pumping at 10%    Other Sister         aneurysm in heart    Diabetes Sister     Diabetes Daughter        Social History     Socioeconomic History    Marital status:      Spouse name: Not on file    Number of children: Not on file    Years of education: Not on file    Highest education level: Not on file   Occupational History    Not on file   Tobacco Use    Smoking status: Former Smoker     Quit date: 8/15/1984     Years since quittin.1    Smokeless tobacco: Never Used    Tobacco comment: former cigarette smoker - smoked for 10 yrs   Substance and Sexual Activity    Alcohol use: No    Drug use: No    Sexual activity: Not Currently     Partners: Male   Other Topics Concern    Not on file   Social History Narrative    Not on file     Social Determinants of Health     Financial Resource Strain:     Difficulty of Paying Living Expenses:    Food Insecurity:     Worried About Running Out of Food in the Last Year:     Ran Out of Food in the Last Year:    Transportation Needs:     Lack of Transportation (Medical):      Lack of Transportation (Non-Medical):    Physical Activity:     Days of Exercise per Week:     Minutes of Exercise per Session:    Stress:     Feeling of Stress :    Social Connections:     Frequency of Communication with Friends and Family:     Frequency of Social Gatherings with Friends and Family:     Attends Spiritism Services:     Active Member of Clubs or Organizations:     Attends Club or Organization Meetings:     Marital Status:    Intimate Partner Violence:     Fear of Current or Ex-Partner:     Emotionally Abused:     Physically Abused:     Sexually Abused: ALLERGIES: Codeine, Buprenorphine hcl, Escitalopram oxalate, Nitrofurantoin monohyd/m-cryst, and Pcn [penicillins]    Review of Systems   Constitutional: Negative for chills and fever. HENT: Negative for rhinorrhea and sore throat. Respiratory: Negative for cough and shortness of breath. Cardiovascular: Negative for chest pain. Gastrointestinal: Negative for abdominal pain, diarrhea, nausea and vomiting. Genitourinary: Negative for dysuria and urgency. Musculoskeletal: Positive for back pain. Negative for arthralgias. Skin: Negative for rash. Neurological: Negative for dizziness, weakness and light-headedness. Vitals:    09/30/21 1509   BP: (!) 134/57   Pulse: 65   Resp: (!) 100   Temp: 98.1 °F (36.7 °C)   SpO2: 100%   Weight: 64 kg (141 lb)   Height: 5' 4\" (1.626 m)            Physical Exam     Vital signs reviewed. Nursing notes reviewed. Const:  No acute distress, well developed, well nourished  Head:  Atraumatic, normocephalic  Eyes:  PERRL, conjunctiva normal, no scleral icterus  Neck:  Supple, trachea midline  Cardiovascular:  Regular rate  Resp:  No resp distress, no increased work of breathing  Abd:  Soft, non-tender, non-distended, no rebound, no guarding, no CVA tenderness  MSK:  No midline C, T, or L spine tenderness, full ROM at the right hip and knee without difficulty, focal tenderness over the right SI joint.   Neuro:  Alert and oriented x3, no cranial nerve defect  Skin:  Warm, dry, intact  Psych: normal mood and affect, behavior is normal, judgement and thought content is normal          MDM  Number of Diagnoses or Management Options     Amount and/or Complexity of Data Reviewed  Clinical lab tests: ordered and reviewed  Tests in the radiology section of CPT®: ordered and reviewed  Review and summarize past medical records: yes    Patient Progress  Patient progress: stable          Ms. Rito Matias is an 83yo female who presents to the ER with complaints of pain at her right SI joint after a fall. No fx on xray or CT. She was able to ambulate with nursing prior to discharge. I will write her for a walker. Pt. Denies any other pain or injuries from the fall. She did not hit her head. Otherwise, she is at her baseline state of health. Pt. Instructed to f/u with her PCP or return to the ER with new or worsening sx.       Procedures

## 2021-11-26 ENCOUNTER — TRANSCRIBE ORDER (OUTPATIENT)
Dept: REGISTRATION | Age: 86
End: 2021-11-26

## 2021-11-26 ENCOUNTER — HOSPITAL ENCOUNTER (OUTPATIENT)
Dept: PREADMISSION TESTING | Age: 86
Discharge: HOME | End: 2021-11-26
Attending: INTERNAL MEDICINE
Payer: MEDICARE

## 2021-11-26 DIAGNOSIS — U07.1 COVID-19: Primary | ICD-10-CM

## 2021-11-26 DIAGNOSIS — U07.1 COVID-19: ICD-10-CM

## 2021-11-26 PROCEDURE — U0005 INFEC AGEN DETEC AMPLI PROBE: HCPCS

## 2021-11-27 LAB
SARS-COV-2, XPLCVT: NOT DETECTED
SOURCE, COVRS: NORMAL

## 2021-11-30 ENCOUNTER — ANESTHESIA (OUTPATIENT)
Dept: ENDOSCOPY | Age: 86
End: 2021-11-30
Payer: MEDICARE

## 2021-11-30 ENCOUNTER — ANESTHESIA EVENT (OUTPATIENT)
Dept: ENDOSCOPY | Age: 86
End: 2021-11-30
Payer: MEDICARE

## 2021-11-30 ENCOUNTER — HOSPITAL ENCOUNTER (OUTPATIENT)
Age: 86
Setting detail: OUTPATIENT SURGERY
Discharge: HOME | End: 2021-11-30
Attending: INTERNAL MEDICINE | Admitting: INTERNAL MEDICINE
Payer: MEDICARE

## 2021-11-30 VITALS
BODY MASS INDEX: 23.73 KG/M2 | OXYGEN SATURATION: 97 % | WEIGHT: 139 LBS | DIASTOLIC BLOOD PRESSURE: 80 MMHG | HEART RATE: 80 BPM | SYSTOLIC BLOOD PRESSURE: 117 MMHG | HEIGHT: 64 IN | TEMPERATURE: 98.4 F | RESPIRATION RATE: 18 BRPM

## 2021-11-30 PROCEDURE — 88305 TISSUE EXAM BY PATHOLOGIST: CPT

## 2021-11-30 PROCEDURE — 76040000019: Performed by: INTERNAL MEDICINE

## 2021-11-30 PROCEDURE — 87077 CULTURE AEROBIC IDENTIFY: CPT | Performed by: INTERNAL MEDICINE

## 2021-11-30 PROCEDURE — 77030021593 HC FCPS BIOP ENDOSC BSC -A: Performed by: INTERNAL MEDICINE

## 2021-11-30 PROCEDURE — 74011250636 HC RX REV CODE- 250/636: Performed by: NURSE ANESTHETIST, CERTIFIED REGISTERED

## 2021-11-30 PROCEDURE — 76060000031 HC ANESTHESIA FIRST 0.5 HR: Performed by: INTERNAL MEDICINE

## 2021-11-30 PROCEDURE — 2709999900 HC NON-CHARGEABLE SUPPLY: Performed by: INTERNAL MEDICINE

## 2021-11-30 RX ORDER — NALOXONE HYDROCHLORIDE 0.4 MG/ML
0.4 INJECTION, SOLUTION INTRAMUSCULAR; INTRAVENOUS; SUBCUTANEOUS
Status: DISCONTINUED | OUTPATIENT
Start: 2021-11-30 | End: 2021-11-30 | Stop reason: HOSPADM

## 2021-11-30 RX ORDER — TRAZODONE HYDROCHLORIDE 100 MG/1
100 TABLET ORAL
COMMUNITY

## 2021-11-30 RX ORDER — FENTANYL CITRATE 50 UG/ML
200 INJECTION, SOLUTION INTRAMUSCULAR; INTRAVENOUS
Status: DISCONTINUED | OUTPATIENT
Start: 2021-11-30 | End: 2021-11-30 | Stop reason: HOSPADM

## 2021-11-30 RX ORDER — FLUMAZENIL 0.1 MG/ML
0.2 INJECTION INTRAVENOUS
Status: DISCONTINUED | OUTPATIENT
Start: 2021-11-30 | End: 2021-11-30 | Stop reason: HOSPADM

## 2021-11-30 RX ORDER — SODIUM CHLORIDE 9 MG/ML
50 INJECTION, SOLUTION INTRAVENOUS CONTINUOUS
Status: DISCONTINUED | OUTPATIENT
Start: 2021-11-30 | End: 2021-11-30 | Stop reason: HOSPADM

## 2021-11-30 RX ORDER — SODIUM CHLORIDE 0.9 % (FLUSH) 0.9 %
5-40 SYRINGE (ML) INJECTION AS NEEDED
Status: DISCONTINUED | OUTPATIENT
Start: 2021-11-30 | End: 2021-11-30 | Stop reason: HOSPADM

## 2021-11-30 RX ORDER — SODIUM CHLORIDE 0.9 % (FLUSH) 0.9 %
5-40 SYRINGE (ML) INJECTION EVERY 8 HOURS
Status: DISCONTINUED | OUTPATIENT
Start: 2021-11-30 | End: 2021-11-30 | Stop reason: HOSPADM

## 2021-11-30 RX ORDER — EPINEPHRINE 0.1 MG/ML
1 INJECTION INTRACARDIAC; INTRAVENOUS
Status: DISCONTINUED | OUTPATIENT
Start: 2021-11-30 | End: 2021-11-30 | Stop reason: HOSPADM

## 2021-11-30 RX ORDER — MIDAZOLAM HYDROCHLORIDE 1 MG/ML
.25-1 INJECTION, SOLUTION INTRAMUSCULAR; INTRAVENOUS
Status: DISCONTINUED | OUTPATIENT
Start: 2021-11-30 | End: 2021-11-30 | Stop reason: HOSPADM

## 2021-11-30 RX ORDER — ATROPINE SULFATE 0.1 MG/ML
0.5 INJECTION INTRAVENOUS
Status: DISCONTINUED | OUTPATIENT
Start: 2021-11-30 | End: 2021-11-30 | Stop reason: HOSPADM

## 2021-11-30 RX ORDER — DEXTROMETHORPHAN/PSEUDOEPHED 2.5-7.5/.8
1.2 DROPS ORAL
Status: DISCONTINUED | OUTPATIENT
Start: 2021-11-30 | End: 2021-11-30 | Stop reason: HOSPADM

## 2021-11-30 RX ORDER — PROPOFOL 10 MG/ML
INJECTION, EMULSION INTRAVENOUS AS NEEDED
Status: DISCONTINUED | OUTPATIENT
Start: 2021-11-30 | End: 2021-11-30 | Stop reason: HOSPADM

## 2021-11-30 RX ORDER — SODIUM CHLORIDE 9 MG/ML
INJECTION, SOLUTION INTRAVENOUS
Status: DISCONTINUED | OUTPATIENT
Start: 2021-11-30 | End: 2021-11-30 | Stop reason: HOSPADM

## 2021-11-30 RX ADMIN — PROPOFOL 25 MG: 10 INJECTION, EMULSION INTRAVENOUS at 09:08

## 2021-11-30 RX ADMIN — SODIUM CHLORIDE: 900 INJECTION, SOLUTION INTRAVENOUS at 08:55

## 2021-11-30 RX ADMIN — PROPOFOL 75 MG: 10 INJECTION, EMULSION INTRAVENOUS at 09:06

## 2021-11-30 RX ADMIN — PROPOFOL 40 MG: 10 INJECTION, EMULSION INTRAVENOUS at 09:12

## 2021-11-30 RX ADMIN — PROPOFOL 20 MG: 10 INJECTION, EMULSION INTRAVENOUS at 09:15

## 2021-11-30 NOTE — ANESTHESIA PREPROCEDURE EVALUATION
Anesthetic History   No history of anesthetic complications            Review of Systems / Medical History  Patient summary reviewed, nursing notes reviewed and pertinent labs reviewed    Pulmonary        Sleep apnea    Asthma        Neuro/Psych   Within defined limits           Cardiovascular    Hypertension  Valvular problems/murmurs        CAD         GI/Hepatic/Renal     GERD           Endo/Other    Diabetes    Arthritis     Other Findings   Comments: fibromyalgia           Physical Exam    Airway  Mallampati: II  TM Distance: > 6 cm  Neck ROM: normal range of motion   Mouth opening: Normal     Cardiovascular  Regular rate and rhythm,  S1 and S2 normal,  no murmur, click, rub, or gallop             Dental  No notable dental hx       Pulmonary  Breath sounds clear to auscultation               Abdominal  GI exam deferred       Other Findings            Anesthetic Plan    ASA: 3  Anesthesia type: MAC          Induction: Intravenous  Anesthetic plan and risks discussed with: Patient

## 2021-11-30 NOTE — ANESTHESIA POSTPROCEDURE EVALUATION
Procedure(s):  ESOPHAGOGASTRODUODENOSCOPY (EGD)   :-  ESOPHAGOGASTRODUODENAL (EGD) BIOPSY. MAC    Anesthesia Post Evaluation        Patient participation: complete - patient participated  Level of consciousness: awake  Pain management: adequate  Airway patency: patent  Anesthetic complications: no  Cardiovascular status: hemodynamically stable  Respiratory status: acceptable  Hydration status: acceptable  Comments: The patient is ready for PACU discharge.   Jena Marie DO                   Post anesthesia nausea and vomiting:  controlled      INITIAL Post-op Vital signs:   Vitals Value Taken Time   /80 11/30/21 0950   Temp 36.9 °C (98.4 °F) 11/30/21 0934   Pulse 80 11/30/21 0950   Resp 18 11/30/21 0950   SpO2 97 % 11/30/21 0950

## 2021-11-30 NOTE — PROGRESS NOTES

## 2021-11-30 NOTE — PROCEDURES
[address]    Endoscopic Gastroduodenoscopy Procedure Note    Sp Gtz  1935  [id number]    Procedure: Endoscopic Gastroduodenoscopy --diagnostic    Indication: Epigastric Pain     Pre-operative Diagnosis: see indication above    Post-operative Diagnosis: see findings below    : Sukumar Macias MD    Referring Provider:  Elijah Toledo MD      Anesthesia/Sedation:  MAC anesthesia Propofol    Airway assessment: No airway problems anticipated    Procedure Details     After infomed consent was obtained for the procedure, with all risks and benefits of procedure explained the patient was taken to the endoscopy suite and placed in the left lateral decubitus position. Following sequential administration of sedation as per above, the endoscope was inserted into the mouth and advanced under direct vision to second portion of the duodenum. A careful inspection was made as the gastroscope was withdrawn, including a retroflexed view of the proximal stomach; findings and interventions are described below. Findings:   Esophagus:Hiatus Hernia  Stomach: Gastritis  Duodenum/jejunum: normal      Therapies:  none    Specimens: Gastric, duodenal and esophageal biopsies taken           EBL:  None. Complications:   None; patient tolerated the procedure well. Implants: None    Surgical Assistant: None       Impression:    -Hiatus Hernia. Acute Gastritis    Pt. was Alert. Left the endoscopy suite in good general condition. Recommendations:  -Follow up with me.     Sukumar Macias MD

## 2021-11-30 NOTE — DISCHARGE INSTRUCTIONS
Dr Josh Montez of [address] [place] [address]. [address] [facility]  [phone]    Peggy Chew  [id number]  1935    EGD DISCHARGE INSTRUCTIONS    DISCOMFORT  Sore throat- throat lozenges or warm salt water gargle  redness at IV site- apply warm compress to area; if redness or soreness persist- contact your physician  Gaseous discomfort- walking, belching will help relieve any discomfort    DIET  You may resume your regular diet. Avoid these foods: vegetables, fried / greasy foods, carbonated drinks  You may not drink alcoholic beverages for at least 12 hours    ACTIVITY  Spend the remainder of the day resting -  avoid any strenuous activity. You may then resume your normal daily activities. You may not operate a vehicle for 12 hours  You may not engage in an occupation involving machinery or appliances for rest of today  Avoid making any critical decisions for at least 24 hour    CALL M.D.  IF ANY SIGN OF   Increasing pain, nausea, vomiting  Abdominal distension (swelling)  New increased bleeding (oral or rectal)  Fever (chills)  Pain in chest area  Bloody discharge from nose or mouth  Shortness of breath    FOLLOW-UP INSTRUCTIONS:  Call Dr. Gordo Hoover for any questions or problems. Telephone # [phone]  Biopsy results available  in  5 to 7 days. We will see you in the office in 2 weeks. Impression:  Hiatal Hernia  Gastritis        Learning About Coronavirus (COVID-19)  Coronavirus (COVID-19): Overview  What is coronavirus (HRMMO-12)? The coronavirus disease (COVID-19) is caused by a virus. It is an illness that was first found in [place], [place], in December 2019. It has since spread worldwide. The virus can cause fever, cough, and trouble breathing. In severe cases, it can cause pneumonia and make it hard to breathe without help. It can cause death. Coronaviruses are a large group of viruses. They cause the common cold.  They also cause more serious illnesses like Middle East respiratory syndrome (MERS) and severe acute respiratory syndrome (SARS). COVID-19 is caused by a novel coronavirus. That means it's a new type that has not been seen in people before. This virus spreads person-to-person through droplets from coughing and sneezing. It can also spread when you are close to someone who is infected. And it can spread when you touch something that has the virus on it, such as a doorknob or a tabletop. What can you do to protect yourself from coronavirus (COVID-19)? The best way to protect yourself from getting sick is to:  · Avoid areas where there is an outbreak. · Avoid contact with people who may be infected. · Wash your hands often with soap or alcohol-based hand sanitizers. · Avoid crowds and try to stay at least 6 feet away from other people. · Wash your hands often, especially after you cough or sneeze. Use soap and water, and scrub for at least 20 seconds. If soap and water aren't available, use an alcohol-based hand . · Avoid touching your mouth, nose, and eyes. What can you do to avoid spreading the virus to others? To help avoid spreading the virus to others:  · Cover your mouth with a tissue when you cough or sneeze. Then throw the tissue in the trash. · Use a disinfectant to clean things that you touch often. · Stay home if you are sick or have been exposed to the virus. Don't go to school, work, or public areas. And don't use public transportation. · If you are sick:  ? Leave your home only if you need to get medical care. But call the doctor's office first so they know you're coming. And wear a face mask, if you have one.  ? If you have a face mask, wear it whenever you're around other people. It can help stop the spread of the virus when you cough or sneeze. ? Clean and disinfect your home every day. Use household  and disinfectant wipes or sprays. Take special care to clean things that you grab with your hands. These include doorknobs, remote controls, phones, and handles on your refrigerator and microwave. And don't forget countertops, tabletops, bathrooms, and computer keyboards. When to call for help  Call 911 anytime you think you may need emergency care. For example, call if:  · You have severe trouble breathing. (You can't talk at all.)  · You have constant chest pain or pressure. · You are severely dizzy or lightheaded. · You are confused or can't think clearly. · Your face and lips have a blue color. · You pass out (lose consciousness) or are very hard to wake up. Call your doctor now if you develop symptoms such as:  · Shortness of breath. · Fever. · Cough. If you need to get care, call ahead to the doctor's office for instructions before you go. Make sure you wear a face mask, if you have one, to prevent exposing other people to the virus. Where can you get the latest information? The following health organizations are tracking and studying this virus. Their websites contain the most up-to-date information. Glory Ramos also learn what to do if you think you may have been exposed to the virus. · U.S. Centers for Disease Control and Prevention (CDC): The CDC provides updated news about the disease and travel advice. The website also tells you how to prevent the spread of infection. www.cdc.gov  · World Health Organization [facility]): WHO offers information about the virus outbreaks. WHO also has travel advice. www.who.int  Current as of: April 1, 2020               Content Version: 12.4  © 2006-2020 Healthwise[employer]orporated. Care instructions adapted under license by your healthcare professional. If you have questions about a medical condition or this instruction, always ask your healthcare professional. [address] any warranty or liability for your use of this information.

## 2021-11-30 NOTE — ROUTINE PROCESS
Alice Noriega [facility]  1935  [id number]    Situation:  Verbal report received from: Ariane Garcia RN  Procedure: Procedure(s):  ESOPHAGOGASTRODUODENOSCOPY (EGD)   :-  ESOPHAGOGASTRODUODENAL (EGD) BIOPSY    Background:    Preoperative diagnosis: GERD/EPIGASTRIC PAIN  Postoperative diagnosis: Hiatal Hernia  Gastritis    :  Dr. Mima Hassan  Assistant(s): Endoscopy Technician-1: Mckenna Su  Endoscopy RN-1: Franklyn Montiel RN    Specimens:   ID Type Source Tests Collected by Time Destination   1 : Duodenum bx Preservative Duodenum  Cassandra Alfredo MD 11/30/2021 6384 Pathology   2 : GE Junction bx Preservative GE George Leary MD 11/30/2021 0915 Pathology     H. Pylori  yes    Assessment:  Intra-procedure medications   Anesthesia gave intra-procedure sedation and medications, see anesthesia flow sheet yes    Intravenous fluids: NS@ KVO     Vital signs stable     Abdominal assessment: round and soft     Recommendation:  Discharge patient per MD order.     Family or Friend   Permission to share finding with family or friend yes

## 2022-01-07 ENCOUNTER — HOSPITAL ENCOUNTER (OUTPATIENT)
Dept: PREADMISSION TESTING | Age: 87
Discharge: HOME | End: 2022-01-07
Attending: INTERNAL MEDICINE
Payer: MEDICARE

## 2022-01-07 ENCOUNTER — TRANSCRIBE ORDER (OUTPATIENT)
Dept: REGISTRATION | Age: 87
End: 2022-01-07

## 2022-01-07 DIAGNOSIS — Z01.812 PRE-PROCEDURE LAB EXAM: ICD-10-CM

## 2022-01-07 DIAGNOSIS — Z01.812 PRE-PROCEDURE LAB EXAM: Primary | ICD-10-CM

## 2022-01-07 PROCEDURE — U0005 INFEC AGEN DETEC AMPLI PROBE: HCPCS

## 2022-01-09 LAB
SARS-COV-2, XPLCVT: NOT DETECTED
SOURCE, COVRS: NORMAL

## 2022-03-18 PROBLEM — M54.50 LOW BACK PAIN: Status: ACTIVE | Noted: 2017-06-02

## 2022-03-18 PROBLEM — M79.604 PAIN OF RIGHT LOWER EXTREMITY: Status: ACTIVE | Noted: 2017-06-02

## 2022-03-19 PROBLEM — Z90.710 H/O: HYSTERECTOMY: Status: ACTIVE | Noted: 2020-05-21

## 2022-04-28 ENCOUNTER — HOSPITAL ENCOUNTER (OUTPATIENT)
Dept: NUCLEAR MEDICINE | Age: 87
Discharge: HOME | End: 2022-04-28
Attending: INTERNAL MEDICINE
Payer: MEDICARE

## 2022-04-28 DIAGNOSIS — E11.9 DIABETES MELLITUS (HCC): ICD-10-CM

## 2022-04-28 DIAGNOSIS — R14.2 BELCHING: ICD-10-CM

## 2022-04-28 PROCEDURE — 78264 GASTRIC EMPTYING IMG STUDY: CPT

## 2022-04-28 RX ORDER — TECHNETIUM TC 99M SULFUR COLLOID 2 MG
0.32 KIT MISCELLANEOUS
Status: COMPLETED | OUTPATIENT
Start: 2022-04-28 | End: 2022-04-28

## 2022-04-28 RX ADMIN — TECHNETIUM TC 99M SULFUR COLLOID 0.32 MILLICURIE: KIT at 13:00

## 2023-02-17 ENCOUNTER — OFFICE VISIT (OUTPATIENT)
Dept: OBGYN CLINIC | Age: 88
End: 2023-02-17
Payer: MEDICARE

## 2023-02-17 VITALS
BODY MASS INDEX: 23.9 KG/M2 | HEART RATE: 71 BPM | WEIGHT: 140 LBS | HEIGHT: 64 IN | SYSTOLIC BLOOD PRESSURE: 104 MMHG | DIASTOLIC BLOOD PRESSURE: 59 MMHG

## 2023-02-17 DIAGNOSIS — N76.2 ACUTE VULVITIS: ICD-10-CM

## 2023-02-17 DIAGNOSIS — N95.2 VAGINAL ATROPHY: ICD-10-CM

## 2023-02-17 DIAGNOSIS — R30.0 BURNING WITH URINATION: Primary | ICD-10-CM

## 2023-02-17 LAB
BILIRUB UR QL STRIP: NEGATIVE
GLUCOSE UR-MCNC: NEGATIVE MG/DL
KETONES P FAST UR STRIP-MCNC: NEGATIVE MG/DL
PH UR STRIP: 5 [PH] (ref 4.6–8)
PROT UR QL STRIP: NEGATIVE
SP GR UR STRIP: 1.02 (ref 1–1.03)
UA UROBILINOGEN AMB POC: NORMAL (ref 0.2–1)
URINALYSIS CLARITY POC: CLEAR
URINALYSIS COLOR POC: NORMAL
URINE BLOOD POC: NEGATIVE
URINE LEUKOCYTES POC: NEGATIVE
URINE NITRITES POC: NEGATIVE

## 2023-02-17 RX ORDER — TRIAMCINOLONE ACETONIDE 1 MG/G
OINTMENT TOPICAL 2 TIMES DAILY
Qty: 30 G | Refills: 0 | Status: SHIPPED | OUTPATIENT
Start: 2023-02-17 | End: 2023-02-17

## 2023-02-17 RX ORDER — HYDROXYZINE HYDROCHLORIDE 10 MG/1
10 TABLET, FILM COATED ORAL
Qty: 10 TABLET | Refills: 0 | Status: SHIPPED | OUTPATIENT
Start: 2023-02-17 | End: 2023-02-27

## 2023-02-17 RX ORDER — SUCRALFATE 1 G/1
TABLET ORAL
COMMUNITY
Start: 2023-02-16

## 2023-02-17 RX ORDER — TRIAMCINOLONE ACETONIDE 1 MG/G
OINTMENT TOPICAL 2 TIMES DAILY
Qty: 30 G | Refills: 0 | Status: SHIPPED | OUTPATIENT
Start: 2023-02-17

## 2023-02-17 NOTE — PROGRESS NOTES
Pauly Edwards is a 82 y.o. female presents for a problem visit. Chief Complaint   Patient presents with    Urinary Burning     No LMP recorded. Patient has had a hysterectomy. Birth Control: post menopausal status. Last Pap: pt unsure     The patient is reporting having: vaginal burning x1 year. Pt reports burning with urination & without urination. Pt reports the burning is almost constant. Pt saw urologist ~6 months ago with NI, she does not plan on returning back to urology. Pt reports urologist prescribed her estradiol cream 0.01%, she uses it almost daily. Pt reports vaginal dryness. Pt denies bleeding. Ae/mammo due  She reports the symptoms are is unchanged. Aggravating factors include none. And alleviating factors include none. 1. Have you been to the ER, urgent care clinic, or hospitalized since your last visit? Yes ~3-4 months ago, stomach upset    2. Have you seen or consulted any other health care providers outside of the [address] [place] [address] since your last visit?   Urologist & PCP & gastro    Examination chaperoned by Maurilio Duran MA.

## 2023-02-17 NOTE — PROGRESS NOTES
[facility]  [email or web address]  [phone]    Sidra Fuentes MD, [address] [facility]       OB/GYN Problem visit      HPI  Mary Rubio is a , 82 y.o. female who presents for a problem visit. Chief Complaint   Patient presents with    Urinary Burning       Pt co vulvar burning b/l for months. Warm compress helps. NI with estrogen. Per Rooming Note:  No LMP recorded. Patient has had a hysterectomy. Birth Control: post menopausal status. Last Pap: pt unsure      The patient is reporting having: vaginal burning x1 year. Pt reports burning with urination & without urination. Pt reports the burning is almost constant. Pt saw urologist ~6 months ago with NI, she does not plan on returning back to urology. Pt reports urologist prescribed her estradiol cream 0.01%, she uses it almost daily. Pt reports vaginal dryness. Pt denies bleeding. Ae/mammo due  She reports the symptoms are is unchanged. Aggravating factors include none. And alleviating factors include none.      Also having \"back problems\"    Sexual history and Contraception:  Social History     Substance and Sexual Activity   Sexual Activity Not Currently    Partners: Male       Past Medical History:   Diagnosis Date    Anxiety     Aortic valve disorders 2011    Arthritis     Asthma     off and on per pt    Back pain     Chronic pain     both legs/had 2 ESIs in back/has 2 bad discs in back    Coronary artery disease     Coronary atherosclerosis of native coronary artery 2011    pt unsure of this    Diabetes ([address]     Fibromyalgia     GERD (gastroesophageal reflux disease)     hiatal hernia    Heart failure ([address]     CHF    Hypertension     Ill-defined condition     \"broken tailbone\"    Left heart failure ([address] 2011    Other chest pain 2011    Unspecified sleep apnea     does not wear CPAP - minor     Current Outpatient Medications   Medication Sig    OTHER Estradiol 0.01% daily    triamcinolone acetonide (KENALOG) 0.1 % ointment Apply  to affected area two (2) times a day. use thin layer on vulva x 10 days then every day.    hydrOXYzine HCL (ATARAX) 10 mg tablet Take 1 Tablet by mouth nightly as needed for Itching (vulvar irritation) for up to 10 days. Indications: itching    traZODone (DESYREL) 100 mg tablet Take 100 mg by mouth nightly. budesonide-formoteroL (SYMBICORT) 160-4.5 mcg/actuation HFAA Take 2 Puffs by inhalation. clonazePAM (KlonoPIN) 0.5 mg tablet TAKE 1 TABLET BY MOUTH TWICE A DAY    traMADoL (ULTRAM) 50 mg tablet TAKE 1 TABLET BY MOUTH TWICE A DAY    hydroCHLOROthiazide (HYDRODIURIL) 25 mg tablet Take 25 mg by mouth daily. glipiZIDE (GLUCOTROL) 5 mg tablet Take 5 mg by mouth daily. 2 tabs in morning, 1 tab at bedtime    spironolactone (ALDACTONE) 25 mg tablet Take 25 mg by mouth daily. sucralfate (CARAFATE) 1 gram tablet     meloxicam (MOBIC) 15 mg tablet Take 15 mg by mouth daily. (Patient not taking: Reported on 2023)     No current facility-administered medications for this visit.      OB History    Para Term  AB Living   3 3 3     3   SAB IAB Ectopic Molar Multiple Live Births                    # Outcome Date GA Lbr Chan/2nd Weight Sex Delivery Anes PTL Lv   3 Term            2 Term            1 Term              Past Surgical History:   Procedure Laterality Date    COLONOSCOPY N/A 10/27/2016    COLONOSCOPY performed by Aury Cohen MD at [facility] ENDOSCOPY    [facility] [address]      done with cholecystectomy    HX CHOLECYSTECTOMY      HX HYSTERECTOMY      HX ORTHOPAEDIC      bilateral partial knee replacements - inside of both knees replaced    HX ORTHOPAEDIC      bilateral trigger finger release - total of 4 - twice on middle finger on each hand    HX OTHER SURGICAL      EGD/colonoscopy x 3 - colon polyp    HX TONSILLECTOMY      HX WISDOM TEETH EXTRACTION      IMPLANT BREAST SILICONE/EQ Bilateral     Removed are 3 different sets/removed - no implants at this time    [facility] UNLISTED PROCEDURE CARDIAC SURGERY      aortic valve replacement - cow valve     Family History   Problem Relation Age of Onset    Colon Cancer Mother     Heart Failure Mother     Diabetes Father     Heart Disease Sister         pumping at 10%    Other Sister         aneurysm in heart    Diabetes Sister     Diabetes Daughter      Social History     Socioeconomic History    Marital status:      Spouse name: Not on file    Number of children: Not on file    Years of education: Not on file    Highest education level: Not on file   Occupational History    Not on file   Tobacco Use    Smoking status: Former     Types: Cigarettes     Quit date: 8/15/1984     Years since quittin.5    Smokeless tobacco: Never    Tobacco comments:     former cigarette smoker - smoked for 10 yrs   Substance and Sexual Activity    Alcohol use: No    Drug use: No    Sexual activity: Not Currently     Partners: Male   Other Topics Concern    Not on file   Social History Narrative    Not on file     Social Determinants of Health     Financial Resource Strain: Not on file   Food Insecurity: Not on file   Transportation Needs: Not on file   Physical Activity: Not on file   Stress: Not on file   Social Connections: Not on file   Intimate Partner Violence: Not on file   Housing Stability: Not on file     Tobacco History:  reports that she quit smoking about 38 years ago. She has never used smokeless tobacco.  Alcohol Abuse:  reports no history of alcohol use. Drug Abuse:  reports no history of drug use.   Allergies   Allergen Reactions    Codeine Itching    Buprenorphine Hcl Unknown (comments)    Ciprofloxacin Diarrhea    Escitalopram Oxalate Unknown (comments)    Mirtazapine Other (comments)    Nitrofurantoin Monohyd/M-Cryst Other (comments) and Unknown (comments)     Muscle weakness    Pcn [Penicillins] Rash     Itching, facial swelling, pounding heart    Wellbutrin [Bupropion] Other (comments)       Patient Active Problem List   Diagnosis Code    Aortic valve disorders I35.9    Other primary cardiomyopathies I42.8    Coronary atherosclerosis of native coronary artery I25.10    Other chest pain R07.89    Left heart failure (HCC) I50.1    Low back pain M54.50    Pain of right lower extremity M79.604    H/O: hysterectomy Z90.710             Review of Systems: History obtained from the patient  Constitutional: negative for weight loss, fever, night sweats  Breast: negative for breast lumps, nipple discharge, galactorrhea  GI: negative for change in bowel habits, abdominal pain, black or bloody stools  : see HPI   MSK: negative for back pain, joint pain, muscle pain  Skin: negative for itching, rash, hives  Psych: negative for anxiety, depression, change in mood      Objective:  Visit Vitals  BP (!) 104/59   Pulse 71   Ht 5' 4\" (1.626 m)   Wt 140 lb (63.5 kg)   BMI 24.03 kg/m²       PHYSICAL EXAMINATION:  GENERAL: alert, well appearing, and in no distress  HEAD: normocephalic, atraumatic. ABDOMEN: soft, nontender, nondistended, no masses or organomegaly   EGBUS: no lesions, no inflammation, no masses  VULVA: normal appearing vulva with no masses, tenderness or lesions  NEURO: alert, grossly oriented to place and time, normal speech,         ASSESSMENT:    ICD-10-CM ICD-9-CM    1. Burning with urination  R30.0 788.1 AMB POC URINALYSIS DIP STICK MANUAL W/O MICRO      2. Acute vulvitis  N76.2 616.10       3. Vaginal atrophy  N95.2 627.3           PLAN:  Orders Placed This Encounter    AMB POC URINALYSIS DIP STICK MANUAL W/O MICRO    sucralfate (CARAFATE) 1 gram tablet    OTHER     Sig: Estradiol 0.01% daily    DISCONTD: triamcinolone acetonide (KENALOG) 0.1 % ointment     Sig: Apply  to affected area two (2) times a day. use thin layer     Dispense:  30 g     Refill:  0    triamcinolone acetonide (KENALOG) 0.1 % ointment     Sig: Apply  to affected area two (2) times a day. use thin layer on vulva x 10 days then every day.      Dispense:  30 g     Refill: 0    hydrOXYzine HCL (ATARAX) 10 mg tablet     Sig: Take 1 Tablet by mouth nightly as needed for Itching (vulvar irritation) for up to 10 days. Indications: itching     Dispense:  10 Tablet     Refill:  0       The patient was instructed to follow up as needed if symptoms persist or worsen. Instructions were given to patient and the patient was given the opportunity to ask any questions concerning the visit today. The patient should keep/make her routine well woman exam.  The patient should contact our office with any questions or concerns. Disc good vulvar hygiene  Topical steroid BID x 10 days then q day  FU 1 month, check sx, consider bx vs trial of temovate. Disc bx if NI vs fu for back, could be referred nerve pain. Can do fu w [employer]  Do not take hydroxyzine and trazodone together, d/w pt  Rec stop ERT if not helping    Today, 02/17/23, I personally spent more than 20 minutes in review of records, documentation,  and face to face counseling with the patient discussing the diagnosis, plan of care and importance of compliance with the treatment plan and performing an exam as well as ordering any appropropriate labs, procedures, or medications.      Results for orders placed or performed in visit on 02/17/23   AMB POC URINALYSIS DIP STICK MANUAL W/O MICRO   Result Value Ref Range    Color (UA POC) Dark Yellow     Clarity (UA POC) Clear     Glucose (UA POC) Negative Negative    Bilirubin (UA POC) Negative Negative    Ketones (UA POC) Negative Negative    Specific gravity (UA POC) 1.020 1.001 - 1.035    Blood (UA POC) Negative Negative    pH (UA POC) 5.0 4.6 - 8.0    Protein (UA POC) Negative Negative    Urobilinogen (UA POC) normal 0.2 - 1    Nitrites (UA POC) Negative Negative    Leukocyte esterase (UA POC) Negative Negative

## 2023-03-26 ENCOUNTER — APPOINTMENT (OUTPATIENT)
Dept: CT IMAGING | Age: 88
End: 2023-03-26
Attending: STUDENT IN AN ORGANIZED HEALTH CARE EDUCATION/TRAINING PROGRAM
Payer: MEDICARE

## 2023-03-26 ENCOUNTER — HOSPITAL ENCOUNTER (OUTPATIENT)
Age: 88
Setting detail: OBSERVATION
Discharge: HOME | End: 2023-03-27
Attending: STUDENT IN AN ORGANIZED HEALTH CARE EDUCATION/TRAINING PROGRAM | Admitting: INTERNAL MEDICINE
Payer: MEDICARE

## 2023-03-26 DIAGNOSIS — N30.00 ACUTE CYSTITIS WITHOUT HEMATURIA: Primary | ICD-10-CM

## 2023-03-26 DIAGNOSIS — N34.2 URETHRITIS: ICD-10-CM

## 2023-03-26 DIAGNOSIS — N18.32 STAGE 3B CHRONIC KIDNEY DISEASE (HCC): ICD-10-CM

## 2023-03-26 DIAGNOSIS — R10.84 ABDOMINAL PAIN, GENERALIZED: ICD-10-CM

## 2023-03-26 DIAGNOSIS — R11.2 NAUSEA AND VOMITING, UNSPECIFIED VOMITING TYPE: ICD-10-CM

## 2023-03-26 PROBLEM — G89.29 CHRONIC PAIN: Status: ACTIVE | Noted: 2023-03-26

## 2023-03-26 PROBLEM — K21.9 GERD (GASTROESOPHAGEAL REFLUX DISEASE): Status: ACTIVE | Noted: 2023-03-26

## 2023-03-26 PROBLEM — F32.A ANXIETY AND DEPRESSION: Status: ACTIVE | Noted: 2023-03-26

## 2023-03-26 PROBLEM — M79.7 FIBROMYALGIA: Status: ACTIVE | Noted: 2023-03-26

## 2023-03-26 PROBLEM — R11.0 NAUSEA: Status: ACTIVE | Noted: 2023-03-26

## 2023-03-26 PROBLEM — I10 HYPERTENSION: Status: ACTIVE | Noted: 2023-03-26

## 2023-03-26 PROBLEM — D72.829 LEUKOCYTOSIS: Status: ACTIVE | Noted: 2023-03-26

## 2023-03-26 PROBLEM — G47.33 OSA (OBSTRUCTIVE SLEEP APNEA): Status: ACTIVE | Noted: 2023-03-26

## 2023-03-26 PROBLEM — E86.0 DEHYDRATION: Status: ACTIVE | Noted: 2023-03-26

## 2023-03-26 PROBLEM — R30.0 DYSURIA: Status: ACTIVE | Noted: 2023-03-26

## 2023-03-26 PROBLEM — E11.29 DM TYPE 2 CAUSING RENAL DISEASE (HCC): Status: ACTIVE | Noted: 2023-03-26

## 2023-03-26 PROBLEM — F41.9 ANXIETY AND DEPRESSION: Status: ACTIVE | Noted: 2023-03-26

## 2023-03-26 PROBLEM — M54.50 LOW BACK PAIN: Status: RESOLVED | Noted: 2017-06-02 | Resolved: 2023-03-26

## 2023-03-26 PROBLEM — Z90.710 H/O: HYSTERECTOMY: Status: RESOLVED | Noted: 2020-05-21 | Resolved: 2023-03-26

## 2023-03-26 PROBLEM — N18.30 CKD (CHRONIC KIDNEY DISEASE) STAGE 3, GFR 30-59 ML/MIN (HCC): Status: ACTIVE | Noted: 2023-03-26

## 2023-03-26 PROBLEM — M79.604 PAIN OF RIGHT LOWER EXTREMITY: Status: RESOLVED | Noted: 2017-06-02 | Resolved: 2023-03-26

## 2023-03-26 PROBLEM — R19.7 DIARRHEA: Status: ACTIVE | Noted: 2023-03-26

## 2023-03-26 PROBLEM — E11.59 DM TYPE 2 CAUSING VASCULAR DISEASE (HCC): Status: ACTIVE | Noted: 2023-03-26

## 2023-03-26 PROBLEM — M19.90 ARTHRITIS: Status: ACTIVE | Noted: 2023-03-26

## 2023-03-26 PROBLEM — E87.1 HYPONATREMIA: Status: ACTIVE | Noted: 2023-03-26

## 2023-03-26 LAB
ALBUMIN SERPL-MCNC: 3.8 G/DL (ref 3.5–5)
ALBUMIN/GLOB SERPL: 1 (ref 1.1–2.2)
ALP SERPL-CCNC: 66 U/L (ref 45–117)
ALT SERPL-CCNC: 34 U/L (ref 12–78)
ANION GAP SERPL CALC-SCNC: 7 MMOL/L (ref 5–15)
APPEARANCE UR: CLEAR
AST SERPL-CCNC: 36 U/L (ref 15–37)
BACTERIA URNS QL MICRO: NEGATIVE /HPF
BASOPHILS # BLD: 0 K/UL (ref 0–0.1)
BASOPHILS NFR BLD: 0 % (ref 0–1)
BILIRUB SERPL-MCNC: 1 MG/DL (ref 0.2–1)
BILIRUB UR QL: NEGATIVE
BUN SERPL-MCNC: 23 MG/DL (ref 6–20)
BUN/CREAT SERPL: 14 (ref 12–20)
CALCIUM SERPL-MCNC: 9.8 MG/DL (ref 8.5–10.1)
CHLORIDE SERPL-SCNC: 102 MMOL/L (ref 97–108)
CO2 SERPL-SCNC: 23 MMOL/L (ref 21–32)
COLOR UR: ABNORMAL
COMMENT, HOLDF: NORMAL
CREAT SERPL-MCNC: 1.65 MG/DL (ref 0.55–1.02)
DIFFERENTIAL METHOD BLD: ABNORMAL
EOSINOPHIL # BLD: 0.1 K/UL (ref 0–0.4)
EOSINOPHIL NFR BLD: 1 % (ref 0–7)
EPITH CASTS URNS QL MICRO: ABNORMAL /LPF
ERYTHROCYTE [DISTWIDTH] IN BLOOD BY AUTOMATED COUNT: 12.9 % (ref 11.5–14.5)
GLOBULIN SER CALC-MCNC: 3.8 G/DL (ref 2–4)
GLUCOSE SERPL-MCNC: 191 MG/DL (ref 65–100)
GLUCOSE UR STRIP.AUTO-MCNC: NEGATIVE MG/DL
HCT VFR BLD AUTO: 50.5 % (ref 35–47)
HGB BLD-MCNC: 17.2 G/DL (ref 11.5–16)
HGB UR QL STRIP: NEGATIVE
HYALINE CASTS URNS QL MICRO: ABNORMAL /LPF (ref 0–2)
IMM GRANULOCYTES # BLD AUTO: 0 K/UL (ref 0–0.04)
IMM GRANULOCYTES NFR BLD AUTO: 0 % (ref 0–0.5)
KETONES UR QL STRIP.AUTO: 15 MG/DL
LEUKOCYTE ESTERASE UR QL STRIP.AUTO: ABNORMAL
LYMPHOCYTES # BLD: 2.9 K/UL (ref 0.8–3.5)
LYMPHOCYTES NFR BLD: 24 % (ref 12–49)
MAGNESIUM SERPL-MCNC: 1.8 MG/DL (ref 1.6–2.4)
MCH RBC QN AUTO: 31.5 PG (ref 26–34)
MCHC RBC AUTO-ENTMCNC: 34.1 G/DL (ref 30–36.5)
MCV RBC AUTO: 92.5 FL (ref 80–99)
MONOCYTES # BLD: 0.8 K/UL (ref 0–1)
MONOCYTES NFR BLD: 6 % (ref 5–13)
NEUTS SEG # BLD: 8.3 K/UL (ref 1.8–8)
NEUTS SEG NFR BLD: 69 % (ref 32–75)
NITRITE UR QL STRIP.AUTO: NEGATIVE
NRBC # BLD: 0 K/UL (ref 0–0.01)
NRBC BLD-RTO: 0 PER 100 WBC
PH UR STRIP: 7 (ref 5–8)
PLATELET # BLD AUTO: 266 K/UL (ref 150–400)
PMV BLD AUTO: 10.6 FL (ref 8.9–12.9)
POTASSIUM SERPL-SCNC: 4.2 MMOL/L (ref 3.5–5.1)
PROT SERPL-MCNC: 7.6 G/DL (ref 6.4–8.2)
PROT UR STRIP-MCNC: NEGATIVE MG/DL
RBC # BLD AUTO: 5.46 M/UL (ref 3.8–5.2)
RBC #/AREA URNS HPF: ABNORMAL /HPF (ref 0–5)
SAMPLES BEING HELD,HOLD: NORMAL
SODIUM SERPL-SCNC: 132 MMOL/L (ref 136–145)
SP GR UR REFRACTOMETRY: 1.01 (ref 1–1.03)
UR CULT HOLD, URHOLD: NORMAL
UROBILINOGEN UR QL STRIP.AUTO: 0.2 EU/DL (ref 0.2–1)
WBC # BLD AUTO: 12 K/UL (ref 3.6–11)
WBC URNS QL MICRO: ABNORMAL /HPF (ref 0–4)

## 2023-03-26 PROCEDURE — 84443 ASSAY THYROID STIM HORMONE: CPT

## 2023-03-26 PROCEDURE — 74011000250 HC RX REV CODE- 250: Performed by: EMERGENCY MEDICINE

## 2023-03-26 PROCEDURE — 81001 URINALYSIS AUTO W/SCOPE: CPT

## 2023-03-26 PROCEDURE — 96375 TX/PRO/DX INJ NEW DRUG ADDON: CPT

## 2023-03-26 PROCEDURE — 74011250637 HC RX REV CODE- 250/637: Performed by: INTERNAL MEDICINE

## 2023-03-26 PROCEDURE — 84145 PROCALCITONIN (PCT): CPT

## 2023-03-26 PROCEDURE — G0378 HOSPITAL OBSERVATION PER HR: HCPCS

## 2023-03-26 PROCEDURE — 74011000250 HC RX REV CODE- 250: Performed by: STUDENT IN AN ORGANIZED HEALTH CARE EDUCATION/TRAINING PROGRAM

## 2023-03-26 PROCEDURE — 74011250636 HC RX REV CODE- 250/636: Performed by: EMERGENCY MEDICINE

## 2023-03-26 PROCEDURE — 74011250636 HC RX REV CODE- 250/636: Performed by: STUDENT IN AN ORGANIZED HEALTH CARE EDUCATION/TRAINING PROGRAM

## 2023-03-26 PROCEDURE — C9113 INJ PANTOPRAZOLE SODIUM, VIA: HCPCS | Performed by: STUDENT IN AN ORGANIZED HEALTH CARE EDUCATION/TRAINING PROGRAM

## 2023-03-26 PROCEDURE — 85025 COMPLETE CBC W/AUTO DIFF WBC: CPT

## 2023-03-26 PROCEDURE — 74176 CT ABD & PELVIS W/O CONTRAST: CPT

## 2023-03-26 PROCEDURE — 80053 COMPREHEN METABOLIC PANEL: CPT

## 2023-03-26 PROCEDURE — 96376 TX/PRO/DX INJ SAME DRUG ADON: CPT

## 2023-03-26 PROCEDURE — 99285 EMERGENCY DEPT VISIT HI MDM: CPT

## 2023-03-26 PROCEDURE — 74011250637 HC RX REV CODE- 250/637: Performed by: STUDENT IN AN ORGANIZED HEALTH CARE EDUCATION/TRAINING PROGRAM

## 2023-03-26 PROCEDURE — 96361 HYDRATE IV INFUSION ADD-ON: CPT

## 2023-03-26 PROCEDURE — 83735 ASSAY OF MAGNESIUM: CPT

## 2023-03-26 RX ORDER — ENOXAPARIN SODIUM 100 MG/ML
30 INJECTION SUBCUTANEOUS DAILY
Status: DISCONTINUED | OUTPATIENT
Start: 2023-03-27 | End: 2023-03-27 | Stop reason: HOSPADM

## 2023-03-26 RX ORDER — PHENAZOPYRIDINE HYDROCHLORIDE 100 MG/1
100 TABLET, FILM COATED ORAL
Status: DISCONTINUED | OUTPATIENT
Start: 2023-03-26 | End: 2023-03-27 | Stop reason: HOSPADM

## 2023-03-26 RX ORDER — POLYETHYLENE GLYCOL 3350 17 G/17G
17 POWDER, FOR SOLUTION ORAL DAILY PRN
Status: DISCONTINUED | OUTPATIENT
Start: 2023-03-26 | End: 2023-03-27 | Stop reason: HOSPADM

## 2023-03-26 RX ORDER — PROCHLORPERAZINE EDISYLATE 5 MG/ML
10 INJECTION INTRAMUSCULAR; INTRAVENOUS
Status: COMPLETED | OUTPATIENT
Start: 2023-03-26 | End: 2023-03-26

## 2023-03-26 RX ORDER — SODIUM CHLORIDE 9 MG/ML
100 INJECTION, SOLUTION INTRAVENOUS CONTINUOUS
Status: DISCONTINUED | OUTPATIENT
Start: 2023-03-26 | End: 2023-03-27

## 2023-03-26 RX ORDER — ONDANSETRON 4 MG/1
4 TABLET, ORALLY DISINTEGRATING ORAL
Status: DISCONTINUED | OUTPATIENT
Start: 2023-03-26 | End: 2023-03-26

## 2023-03-26 RX ORDER — ACETAMINOPHEN 325 MG/1
650 TABLET ORAL
Status: DISCONTINUED | OUTPATIENT
Start: 2023-03-26 | End: 2023-03-27 | Stop reason: HOSPADM

## 2023-03-26 RX ORDER — BUDESONIDE 0.5 MG/2ML
500 INHALANT ORAL
Status: DISCONTINUED | OUTPATIENT
Start: 2023-03-26 | End: 2023-03-27 | Stop reason: HOSPADM

## 2023-03-26 RX ORDER — ONDANSETRON 2 MG/ML
4 INJECTION INTRAMUSCULAR; INTRAVENOUS ONCE
Status: COMPLETED | OUTPATIENT
Start: 2023-03-26 | End: 2023-03-26

## 2023-03-26 RX ORDER — MAG HYDROX/ALUMINUM HYD/SIMETH 200-200-20
30 SUSPENSION, ORAL (FINAL DOSE FORM) ORAL
Status: COMPLETED | OUTPATIENT
Start: 2023-03-26 | End: 2023-03-26

## 2023-03-26 RX ORDER — ACETAMINOPHEN 650 MG/1
650 SUPPOSITORY RECTAL
Status: DISCONTINUED | OUTPATIENT
Start: 2023-03-26 | End: 2023-03-27 | Stop reason: HOSPADM

## 2023-03-26 RX ORDER — IPRATROPIUM BROMIDE AND ALBUTEROL SULFATE 2.5; .5 MG/3ML; MG/3ML
3 SOLUTION RESPIRATORY (INHALATION)
Status: DISCONTINUED | OUTPATIENT
Start: 2023-03-26 | End: 2023-03-27 | Stop reason: HOSPADM

## 2023-03-26 RX ORDER — TRAZODONE HYDROCHLORIDE 50 MG/1
100 TABLET ORAL
Status: DISCONTINUED | OUTPATIENT
Start: 2023-03-26 | End: 2023-03-27 | Stop reason: HOSPADM

## 2023-03-26 RX ORDER — ARFORMOTEROL TARTRATE 15 UG/2ML
15 SOLUTION RESPIRATORY (INHALATION)
Status: DISCONTINUED | OUTPATIENT
Start: 2023-03-26 | End: 2023-03-27 | Stop reason: HOSPADM

## 2023-03-26 RX ORDER — CLONAZEPAM 0.5 MG/1
0.5 TABLET ORAL 2 TIMES DAILY
Status: DISCONTINUED | OUTPATIENT
Start: 2023-03-26 | End: 2023-03-27 | Stop reason: HOSPADM

## 2023-03-26 RX ORDER — ONDANSETRON 4 MG/1
4 TABLET, ORALLY DISINTEGRATING ORAL
Status: DISCONTINUED | OUTPATIENT
Start: 2023-03-26 | End: 2023-03-27 | Stop reason: HOSPADM

## 2023-03-26 RX ORDER — TRAMADOL HYDROCHLORIDE 50 MG/1
50 TABLET ORAL 2 TIMES DAILY
Status: DISCONTINUED | OUTPATIENT
Start: 2023-03-27 | End: 2023-03-27 | Stop reason: HOSPADM

## 2023-03-26 RX ORDER — MORPHINE SULFATE 2 MG/ML
2 INJECTION, SOLUTION INTRAMUSCULAR; INTRAVENOUS ONCE
Status: COMPLETED | OUTPATIENT
Start: 2023-03-26 | End: 2023-03-26

## 2023-03-26 RX ORDER — SUCRALFATE 1 G/1
1 TABLET ORAL
Status: DISCONTINUED | OUTPATIENT
Start: 2023-03-26 | End: 2023-03-27 | Stop reason: HOSPADM

## 2023-03-26 RX ORDER — SUCRALFATE 1 G/1
1 TABLET ORAL
Status: DISCONTINUED | OUTPATIENT
Start: 2023-03-27 | End: 2023-03-26

## 2023-03-26 RX ORDER — KETOROLAC TROMETHAMINE 30 MG/ML
15 INJECTION, SOLUTION INTRAMUSCULAR; INTRAVENOUS ONCE
Status: COMPLETED | OUTPATIENT
Start: 2023-03-26 | End: 2023-03-26

## 2023-03-26 RX ORDER — ONDANSETRON 2 MG/ML
4 INJECTION INTRAMUSCULAR; INTRAVENOUS
Status: DISCONTINUED | OUTPATIENT
Start: 2023-03-26 | End: 2023-03-27 | Stop reason: HOSPADM

## 2023-03-26 RX ADMIN — SODIUM CHLORIDE 500 ML: 9 INJECTION, SOLUTION INTRAVENOUS at 18:57

## 2023-03-26 RX ADMIN — MORPHINE SULFATE 2 MG: 2 INJECTION, SOLUTION INTRAMUSCULAR; INTRAVENOUS at 19:17

## 2023-03-26 RX ADMIN — ONDANSETRON 4 MG: 2 INJECTION INTRAMUSCULAR; INTRAVENOUS at 18:57

## 2023-03-26 RX ADMIN — PHENAZOPYRIDINE 100 MG: 100 TABLET ORAL at 23:38

## 2023-03-26 RX ADMIN — TRAZODONE HYDROCHLORIDE 100 MG: 50 TABLET ORAL at 23:39

## 2023-03-26 RX ADMIN — SUCRALFATE 1 G: 1 TABLET ORAL at 23:38

## 2023-03-26 RX ADMIN — ONDANSETRON 4 MG: 2 INJECTION INTRAMUSCULAR; INTRAVENOUS at 20:44

## 2023-03-26 RX ADMIN — SODIUM CHLORIDE 80 MG: 9 INJECTION INTRAMUSCULAR; INTRAVENOUS; SUBCUTANEOUS at 22:18

## 2023-03-26 RX ADMIN — KETOROLAC TROMETHAMINE 15 MG: 30 INJECTION, SOLUTION INTRAMUSCULAR at 22:24

## 2023-03-26 RX ADMIN — ALUMINUM HYDROXIDE, MAGNESIUM HYDROXIDE, AND SIMETHICONE 30 ML: 200; 200; 20 SUSPENSION ORAL at 22:26

## 2023-03-26 RX ADMIN — PROCHLORPERAZINE EDISYLATE 10 MG: 5 INJECTION INTRAMUSCULAR; INTRAVENOUS at 22:23

## 2023-03-26 RX ADMIN — SODIUM CHLORIDE 1 G: 9 INJECTION INTRAMUSCULAR; INTRAVENOUS; SUBCUTANEOUS at 22:30

## 2023-03-26 RX ADMIN — SODIUM CHLORIDE 1000 ML: 9 INJECTION, SOLUTION INTRAVENOUS at 20:42

## 2023-03-26 RX ADMIN — CLONAZEPAM 0.5 MG: 0.5 TABLET ORAL at 23:39

## 2023-03-26 NOTE — ED PROVIDER NOTES
Patient is an 82-year-old female presented to ED with concern for UTI. Patient states she has had symptoms for about 1 month but has worsened over the last week. She is taken 4 days of doxycycline and now has diarrhea nausea and vomiting that she attributes to doxycycline. Patient has burning with urination. Patient's old doctor prescribed doxycycline for her. Patient's vital signs are stable. Patient is afebrile. Patient does report chills and feeling hot. Patient also reports some low back pain, possible CVA tenderness. The history is provided by the patient.       Past Medical History:   Diagnosis Date    Anxiety     Aortic valve disorders 4/7/2011    Arthritis     Asthma     off and on per pt    Back pain     Chronic pain     both legs/had 2 ESIs in back/has 2 bad discs in back    Coronary artery disease     Coronary atherosclerosis of native coronary artery 4/7/2011    pt unsure of this    Diabetes ([address]     Fibromyalgia     GERD (gastroesophageal reflux disease)     hiatal hernia    Heart failure ([address]     CHF    Hypertension     Ill-defined condition     \"broken tailbone\"    Left heart failure ([address] 4/7/2011    Other chest pain 4/7/2011    Unspecified sleep apnea     does not wear CPAP - minor       Past Surgical History:   Procedure Laterality Date    COLONOSCOPY N/A 10/27/2016    COLONOSCOPY performed by Jose Manuel Lopez MD at [address]      done with cholecystectomy    HX CHOLECYSTECTOMY      HX HYSTERECTOMY      HX ORTHOPAEDIC      bilateral partial knee replacements - inside of both knees replaced    HX ORTHOPAEDIC      bilateral trigger finger release - total of 4 - twice on middle finger on each hand    HX OTHER SURGICAL      EGD/colonoscopy x 3 - colon polyp    HX TONSILLECTOMY      HX WISDOM TEETH EXTRACTION      IMPLANT BREAST SILICONE/EQ Bilateral     Removed are 3 different sets/removed - no implants at this time    [employer]  2013 aortic valve replacement - cow valve         Family History:   Problem Relation Age of Onset    Colon Cancer Mother     Heart Failure Mother     Diabetes Father     Heart Disease Sister         pumping at 10%    Other Sister         aneurysm in heart    Diabetes Sister     Diabetes Daughter        Social History     Socioeconomic History    Marital status:      Spouse name: Not on file    Number of children: Not on file    Years of education: Not on file    Highest education level: Not on file   Occupational History    Not on file   Tobacco Use    Smoking status: Former     Types: Cigarettes     Quit date: 8/15/1984     Years since quittin.6    Smokeless tobacco: Never    Tobacco comments:     former cigarette smoker - smoked for 10 yrs   Substance and Sexual Activity    Alcohol use: No    Drug use: No    Sexual activity: Not Currently     Partners: Male   Other Topics Concern    Not on file   Social History Narrative    Not on file     Social Determinants of Health     Financial Resource Strain: Not on file   Food Insecurity: Not on file   Transportation Needs: Not on file   Physical Activity: Not on file   Stress: Not on file   Social Connections: Not on file   Intimate Partner Violence: Not on file   Housing Stability: Not on file         ALLERGIES: Codeine, Buprenorphine hcl, Ciprofloxacin, Escitalopram oxalate, Mirtazapine, Nitrofurantoin monohyd/m-cryst, Pcn [penicillins], and Wellbutrin [bupropion]    Review of Systems   Constitutional:  Positive for activity change, appetite change, chills and fatigue. Respiratory:  Negative for shortness of breath. Cardiovascular:  Negative for chest pain. Gastrointestinal:  Positive for diarrhea, nausea and vomiting. Negative for abdominal pain. Genitourinary:  Positive for dysuria and flank pain.      Vitals:    23 1840 23 1925 23 2051   BP: (!) 158/87 (!) 153/86    Pulse: 87 73    Resp: 16 16    Temp: 97.2 °F (36.2 °C)     SpO2: 99% 100% 100%   Weight: 61.2 kg (135 lb)     Height: 5' 4\" (1.626 m)              Physical Exam  Vitals and nursing note reviewed. Exam conducted with a chaperone present. Constitutional:       Appearance: Normal appearance. She is ill-appearing. HENT:      Head: Normocephalic and atraumatic. Right Ear: External ear normal.      Left Ear: External ear normal.   Eyes:      Conjunctiva/sclera: Conjunctivae normal.   Cardiovascular:      Rate and Rhythm: Normal rate and regular rhythm. Pulses: Normal pulses. Heart sounds: Normal heart sounds. Pulmonary:      Effort: Pulmonary effort is normal.      Breath sounds: Normal breath sounds. Chest:      Chest wall: No deformity or tenderness. Abdominal:      Palpations: Abdomen is soft. Tenderness: There is no abdominal tenderness. Genitourinary:     Exam position: Supine. Musculoskeletal:         General: No deformity or signs of injury. Normal range of motion. Skin:     General: Skin is warm and dry. Coloration: Skin is not jaundiced. Neurological:      General: No focal deficit present. Mental Status: She is alert and oriented to person, place, and time. Psychiatric:         Mood and Affect: Mood normal.         Behavior: Behavior normal.        Medical Decision Making  Amount and/or Complexity of Data Reviewed  Labs: ordered. Decision-making details documented in ED Course. Radiology: ordered. Risk  OTC drugs. Prescription drug management. Parenteral controlled substances. Decision regarding hospitalization. ED Course as of 03/26/23 2244   Sun Mar 26, 2023   1922 WBC(!): 12.0 [AL]   1922 HGB(!): 17.2 [AL]   1937 Magnesium: 1.8 [AL]   1937 Creatinine(!): 1.65  Appears to be patient's baseline CKD.  [AL]   1937 Sodium(!): 132 [AL]   2207 URINALYSIS W/MICROSCOPIC [AL]   2209 Leukocyte Esterase(!): MODERATE [AL]   2209 Epithelial cells(!): MODERATE [AL]   2209 Bacteria: Negative [AL]   2209 Nitrites: Negative [AL] ED Course User Index  [AL] Eliane Gandhi MD     LABORATORY RESULTS:  Labs Reviewed   URINALYSIS W/MICROSCOPIC - Abnormal; Notable for the following components:       Result Value    Ketone 15 (*)     Leukocyte Esterase MODERATE (*)     Epithelial cells MODERATE (*)     All other components within normal limits   CBC WITH AUTOMATED DIFF - Abnormal; Notable for the following components:    WBC 12.0 (*)     RBC 5.46 (*)     HGB 17.2 (*)     HCT 50.5 (*)     ABS. NEUTROPHILS 8.3 (*)     All other components within normal limits   METABOLIC PANEL, COMPREHENSIVE - Abnormal; Notable for the following components:    Sodium 132 (*)     Glucose 191 (*)     BUN 23 (*)     Creatinine 1.65 (*)     eGFR 30 (*)     A-G Ratio 1.0 (*)     All other components within normal limits   URINE CULTURE HOLD SAMPLE   CULTURE, URINE   MAGNESIUM   SAMPLES BEING HELD       IMAGING RESULTS:  CT ABD PELV WO CONT   Final Result   1. Diverticulosis of the sigmoid colon without evidence of active inflammation. 2.  No hydronephrosis or nephrolithiasis. 3.  No bowel obstruction or inflammation.           MEDICATIONS GIVEN:  Medications   sodium chloride 0.9 % bolus infusion 500 mL (0 mL IntraVENous IV Completed 3/26/23 1956)   ondansetron (ZOFRAN) injection 4 mg (4 mg IntraVENous Given 3/26/23 1857)   morphine injection 2 mg (2 mg IntraVENous Given 3/26/23 1917)   ondansetron (ZOFRAN) injection 4 mg (4 mg IntraVENous Given 3/26/23 2044)   sodium chloride 0.9 % bolus infusion 1,000 mL (1,000 mL IntraVENous New Bag 3/26/23 2042)   pantoprazole (PROTONIX) 80 mg in 0.9% sodium chloride 20 mL injection (80 mg IntraVENous Given 3/26/23 2218)   prochlorperazine (COMPAZINE) injection 10 mg (10 mg IntraVENous Given 3/26/23 2223)   alum-mag hydroxide-simeth (MYLANTA) oral suspension 30 mL (30 mL Oral Given 3/26/23 2226)   ketorolac (TORADOL) injection 15 mg (15 mg IntraVENous Given 3/26/23 2224)   cefTRIAXone (ROCEPHIN) 1 g in 0.9% sodium chloride 10 mL IV syringe (1 g IntraVENous Given 3/26/23 2230)       Differential diagnosis: UTI, partially treated UTI, urethritis, dehydration, electrolyte abnormality, abdominal pain, leg cramping, nausea vomiting, able to tolerate p.o.    ED physician interpretation of laboratory results: UA with some signs of infection with patient having symptomatic UTI and possible bladder spasms. Urine culture sent. Mild hyponatremia. Hyperglycemia without DKA. CKD. MDM: Patient is an 70-year-old female presented ED with extreme pain in the urethra with and without urination for about 1 month. Patient's pain is unbearable. She is now having nausea and vomiting likely due to doxycycline. Doxycycline probably causing partially treated UTI symptoms versus urethritis. Patient having significant pain not responding to morphine. Additional meds ordered to treat pain nausea vomiting. Based on patient's continued pain, age, urethritis versus UTI and difficulty with p.o. due to nausea vomiting hospitalizations indicated for IV antibiotics. Hospital service contacted and patient admitted. DISPOSITION: Admitted    [facility] for Admission  10:34 PM    ED Room Number: ER07/07  Patient Name and age:  Malissa Ratel 82 y.o.  female  Working Diagnosis:   1. Acute cystitis without hematuria    2. Urethritis    3. Nausea and vomiting, unspecified vomiting type    4. Abdominal pain, generalized    5. Stage 3b chronic kidney disease ([facility] [address]        COVID-19 Suspicion:  no  Sepsis present:  no  Reassessment needed: no  Code Status:  Full Code  Readmission: no  Isolation Requirements:  no  Recommended Level of Care:  med/surg  Department: [address] [place] [address] ED - [phone]    Other: Patient presenting to the ED with significant pain and symptoms of UTI. White blood cells and leukocyte esterase in urine but no bacteria. However partially treated with doxycycline for the last 4 days.   Patient having significant pain requiring multiple pain medication as well as multiple antibiotics. Patient still not feeling any better. Bj Karimi.  Johnny Smith MD      Procedures

## 2023-03-26 NOTE — ED TRIAGE NOTES
Pt arrives to the ER for complaints of UTI, reports that she has dysuria. Reports that she has been taking doxycycline but states persistent nausea and diarrhea. Reports that she has been having back pain as well     Reports that she has had this for a month. Denies any fevers, chest pain or shortness of breath.

## 2023-03-27 VITALS
OXYGEN SATURATION: 99 % | BODY MASS INDEX: 23.05 KG/M2 | WEIGHT: 135 LBS | HEART RATE: 75 BPM | HEIGHT: 64 IN | SYSTOLIC BLOOD PRESSURE: 139 MMHG | RESPIRATION RATE: 14 BRPM | DIASTOLIC BLOOD PRESSURE: 45 MMHG | TEMPERATURE: 99.8 F

## 2023-03-27 LAB
ALBUMIN SERPL-MCNC: 3 G/DL (ref 3.5–5)
ALBUMIN/GLOB SERPL: 1.1 (ref 1.1–2.2)
ALP SERPL-CCNC: 48 U/L (ref 45–117)
ALT SERPL-CCNC: 32 U/L (ref 12–78)
ANION GAP SERPL CALC-SCNC: 7 MMOL/L (ref 5–15)
APPEARANCE UR: CLEAR
AST SERPL-CCNC: 35 U/L (ref 15–37)
BACTERIA URNS QL MICRO: NEGATIVE /HPF
BILIRUB SERPL-MCNC: 0.8 MG/DL (ref 0.2–1)
BILIRUB UR QL: NEGATIVE
BUN SERPL-MCNC: 20 MG/DL (ref 6–20)
BUN/CREAT SERPL: 16 (ref 12–20)
CALCIUM SERPL-MCNC: 7.8 MG/DL (ref 8.5–10.1)
CHLORIDE SERPL-SCNC: 108 MMOL/L (ref 97–108)
CO2 SERPL-SCNC: 21 MMOL/L (ref 21–32)
COLOR UR: ABNORMAL
CREAT SERPL-MCNC: 1.28 MG/DL (ref 0.55–1.02)
EPITH CASTS URNS QL MICRO: ABNORMAL /LPF
ERYTHROCYTE [DISTWIDTH] IN BLOOD BY AUTOMATED COUNT: 12.5 % (ref 11.5–14.5)
GLOBULIN SER CALC-MCNC: 2.8 G/DL (ref 2–4)
GLUCOSE SERPL-MCNC: 195 MG/DL (ref 65–100)
GLUCOSE UR STRIP.AUTO-MCNC: NEGATIVE MG/DL
HCT VFR BLD AUTO: 40.3 % (ref 35–47)
HGB BLD-MCNC: 13.5 G/DL (ref 11.5–16)
HGB UR QL STRIP: NEGATIVE
HYALINE CASTS URNS QL MICRO: ABNORMAL /LPF (ref 0–2)
KETONES UR QL STRIP.AUTO: 15 MG/DL
LEUKOCYTE ESTERASE UR QL STRIP.AUTO: ABNORMAL
MAGNESIUM SERPL-MCNC: 1.7 MG/DL (ref 1.6–2.4)
MCH RBC QN AUTO: 30.9 PG (ref 26–34)
MCHC RBC AUTO-ENTMCNC: 33.5 G/DL (ref 30–36.5)
MCV RBC AUTO: 92.2 FL (ref 80–99)
NITRITE UR QL STRIP.AUTO: NEGATIVE
NRBC # BLD: 0 K/UL (ref 0–0.01)
NRBC BLD-RTO: 0 PER 100 WBC
PH UR STRIP: 7 (ref 5–8)
PLATELET # BLD AUTO: 214 K/UL (ref 150–400)
PMV BLD AUTO: 10.6 FL (ref 8.9–12.9)
POTASSIUM SERPL-SCNC: 3.3 MMOL/L (ref 3.5–5.1)
PROCALCITONIN SERPL-MCNC: <0.05 NG/ML
PROT SERPL-MCNC: 5.8 G/DL (ref 6.4–8.2)
PROT UR STRIP-MCNC: NEGATIVE MG/DL
RBC # BLD AUTO: 4.37 M/UL (ref 3.8–5.2)
RBC #/AREA URNS HPF: ABNORMAL /HPF (ref 0–5)
SODIUM SERPL-SCNC: 136 MMOL/L (ref 136–145)
SP GR UR REFRACTOMETRY: 1.01 (ref 1–1.03)
TSH SERPL DL<=0.05 MIU/L-ACNC: 2.63 UIU/ML (ref 0.36–3.74)
UROBILINOGEN UR QL STRIP.AUTO: 0.2 EU/DL (ref 0.2–1)
WBC # BLD AUTO: 8.1 K/UL (ref 3.6–11)
WBC URNS QL MICRO: ABNORMAL /HPF (ref 0–4)

## 2023-03-27 PROCEDURE — 96366 THER/PROPH/DIAG IV INF ADDON: CPT

## 2023-03-27 PROCEDURE — 87086 URINE CULTURE/COLONY COUNT: CPT

## 2023-03-27 PROCEDURE — 94640 AIRWAY INHALATION TREATMENT: CPT

## 2023-03-27 PROCEDURE — 81001 URINALYSIS AUTO W/SCOPE: CPT

## 2023-03-27 PROCEDURE — 97116 GAIT TRAINING THERAPY: CPT

## 2023-03-27 PROCEDURE — 74011000250 HC RX REV CODE- 250: Performed by: INTERNAL MEDICINE

## 2023-03-27 PROCEDURE — 94664 DEMO&/EVAL PT USE INHALER: CPT

## 2023-03-27 PROCEDURE — 96361 HYDRATE IV INFUSION ADD-ON: CPT

## 2023-03-27 PROCEDURE — 74011250636 HC RX REV CODE- 250/636: Performed by: INTERNAL MEDICINE

## 2023-03-27 PROCEDURE — 97161 PT EVAL LOW COMPLEX 20 MIN: CPT

## 2023-03-27 PROCEDURE — 36415 COLL VENOUS BLD VENIPUNCTURE: CPT

## 2023-03-27 PROCEDURE — 97535 SELF CARE MNGMENT TRAINING: CPT

## 2023-03-27 PROCEDURE — 96372 THER/PROPH/DIAG INJ SC/IM: CPT

## 2023-03-27 PROCEDURE — G0378 HOSPITAL OBSERVATION PER HR: HCPCS

## 2023-03-27 PROCEDURE — 85027 COMPLETE CBC AUTOMATED: CPT

## 2023-03-27 PROCEDURE — 97165 OT EVAL LOW COMPLEX 30 MIN: CPT

## 2023-03-27 PROCEDURE — 83735 ASSAY OF MAGNESIUM: CPT

## 2023-03-27 PROCEDURE — 80053 COMPREHEN METABOLIC PANEL: CPT

## 2023-03-27 PROCEDURE — 96365 THER/PROPH/DIAG IV INF INIT: CPT

## 2023-03-27 PROCEDURE — 94761 N-INVAS EAR/PLS OXIMETRY MLT: CPT

## 2023-03-27 PROCEDURE — 74011250637 HC RX REV CODE- 250/637: Performed by: INTERNAL MEDICINE

## 2023-03-27 RX ORDER — CALCIUM GLUCONATE 20 MG/ML
1 INJECTION, SOLUTION INTRAVENOUS ONCE
Status: COMPLETED | OUTPATIENT
Start: 2023-03-27 | End: 2023-03-27

## 2023-03-27 RX ORDER — PHENAZOPYRIDINE HYDROCHLORIDE 95 MG/1
95 TABLET ORAL 3 TIMES DAILY
Qty: 90 TABLET | Refills: 0 | Status: SHIPPED | OUTPATIENT
Start: 2023-03-27

## 2023-03-27 RX ORDER — CEPHALEXIN 500 MG/1
500 CAPSULE ORAL 2 TIMES DAILY
Qty: 14 CAPSULE | Refills: 0 | Status: SHIPPED | OUTPATIENT
Start: 2023-03-27 | End: 2023-04-03

## 2023-03-27 RX ORDER — LISINOPRIL 10 MG/1
10 TABLET ORAL DAILY
Qty: 30 TABLET | Refills: 0 | Status: SHIPPED | OUTPATIENT
Start: 2023-03-27

## 2023-03-27 RX ADMIN — SUCRALFATE 1 G: 1 TABLET ORAL at 11:46

## 2023-03-27 RX ADMIN — ACETAMINOPHEN 650 MG: 325 TABLET ORAL at 03:32

## 2023-03-27 RX ADMIN — ENOXAPARIN SODIUM 30 MG: 100 INJECTION SUBCUTANEOUS at 09:38

## 2023-03-27 RX ADMIN — PHENAZOPYRIDINE 100 MG: 100 TABLET ORAL at 09:37

## 2023-03-27 RX ADMIN — ARFORMOTEROL TARTRATE 15 MCG: 15 SOLUTION RESPIRATORY (INHALATION) at 02:03

## 2023-03-27 RX ADMIN — ARFORMOTEROL TARTRATE 15 MCG: 15 SOLUTION RESPIRATORY (INHALATION) at 07:37

## 2023-03-27 RX ADMIN — CALCIUM GLUCONATE 1000 MG: 20 INJECTION, SOLUTION INTRAVENOUS at 09:48

## 2023-03-27 RX ADMIN — SUCRALFATE 1 G: 1 TABLET ORAL at 09:37

## 2023-03-27 RX ADMIN — BUDESONIDE 500 MCG: 0.5 SUSPENSION RESPIRATORY (INHALATION) at 07:37

## 2023-03-27 RX ADMIN — TRAMADOL HYDROCHLORIDE 50 MG: 50 TABLET ORAL at 09:37

## 2023-03-27 RX ADMIN — BUDESONIDE 500 MCG: 0.5 SUSPENSION RESPIRATORY (INHALATION) at 02:03

## 2023-03-27 RX ADMIN — Medication 1 CAPSULE: at 09:37

## 2023-03-27 RX ADMIN — PHENAZOPYRIDINE 100 MG: 100 TABLET ORAL at 11:47

## 2023-03-27 RX ADMIN — SODIUM CHLORIDE 100 ML/HR: 9 INJECTION, SOLUTION INTRAVENOUS at 02:16

## 2023-03-27 RX ADMIN — CLONAZEPAM 0.5 MG: 0.5 TABLET ORAL at 09:37

## 2023-03-27 NOTE — PROGRESS NOTES
OCCUPATIONAL THERAPY EVALUATION/DISCHARGE  Patient: Marly Cortés (7 y.o. female)  Date: 3/27/2023  Primary Diagnosis: Dysuria [R30.0]       Precautions:        ASSESSMENT  Based on the objective data described below, the patient presents with independent ADL/mobility without assistive device. OT services not indicated. Current Level of Function (ADLs/self-care): independent    Functional Outcome Measure:      PLAN :  Recommend with staff: ambulate to bathroom     Recommendation for discharge: (in order for the patient to meet his/her long term goals)  No skilled occupational therapy/ follow up rehabilitation needs identified at this time.     This discharge recommendation:  Has been made in collaboration with the attending provider and/or case management    IF patient discharges home will need the following DME: patient owns DME required for discharge       SUBJECTIVE:   Patient pleasant and cooperative     OBJECTIVE DATA SUMMARY:   HISTORY:   Past Medical History:   Diagnosis Date    Anxiety and depression     Aortic valve disorders 04/07/2011    Arthritis     Chronic pain     both legs/had 2 ESIs in back/has 2 bad discs in back    CKD (chronic kidney disease) stage 3, GFR 30-59 ml/min ([facility])     DM type 2 causing renal disease ([address]     DM type 2 causing vascular disease ([address]     Fibromyalgia     GERD (gastroesophageal reflux disease)     hiatal hernia    Hypertension     Left heart failure ([address] 04/07/2011    HENNA (obstructive sleep apnea)     no CPAP     Past Surgical History:   Procedure Laterality Date    COLONOSCOPY N/A 10/27/2016    COLONOSCOPY performed by Breana Murguia MD at [facility]      done with cholecystectomy    HX CHOLECYSTECTOMY      HX HYSTERECTOMY      HX ORTHOPAEDIC      bilateral partial knee replacements - inside of both knees replaced    HX ORTHOPAEDIC      bilateral trigger finger release - total of 4 - twice on middle finger on each hand    HX OTHER SURGICAL EGD/colonoscopy x 3 - colon polyp    HX TONSILLECTOMY      HX WISDOM TEETH EXTRACTION      IMPLANT BREAST SILICONE/EQ Bilateral     Removed are 3 different sets/removed - no implants at this time    CO UNLISTED PROCEDURE CARDIAC SURGERY  2013    aortic valve replacement - cow valve       Prior Level of Function/Environment/Context: independent   Expanded or extensive additional review of patient history:   Home Situation  Home Environment: Private residence  # Steps to Enter: 5  Rails to Enter: Yes  One/Two Story Residence: One story  Living Alone: No  Support Systems: Spouse/Significant Other  Patient Expects to be Discharged to[de-identified] Home  Current DME Used/Available at Home: Shower chair  Tub or Shower Type: Shower      EXAMINATION OF PERFORMANCE DEFICITS:  Cognitive/Behavioral Status:  Neurologic State: Alert  Orientation Level: Oriented X4  Cognition: Appropriate decision making; Follows commands           Range of Motion:  WFL                            Strength:  WFL                    Coordination:     Fine Motor Skills-Upper: Left Intact; Right Intact    Gross Motor Skills-Upper: Left Intact; Right Intact    Tone & Sensation:                              Balance:  Sitting: Intact; Without support  Standing: Intact; Without support    Functional Mobility and Transfers for ADLs:  Bed Mobility:  Supine to Sit: Independent  Sit to Supine: Independent    Transfers:  Sit to Stand: Independent  Stand to Sit: Independent  Bed to Chair: Independent    ADL Assessment:  Feeding: Independent    Oral Facial Hygiene/Grooming: Independent    Bathing: Independent         Upper Body Dressing: Independent    Lower Body Dressing: Independent    Toileting: Independent                ADL Intervention and task modifications:                         Occupational Therapy Evaluation Charge Determination   History Examination Decision-Making   LOW Complexity : Brief history review  LOW Complexity : 1-3 performance deficits relating to physical, cognitive , or psychosocial skils that result in activity limitations and / or participation restrictions  LOW Complexity : No comorbidities that affect functional and no verbal or physical assistance needed to complete eval tasks       Based on the above components, the patient evaluation is determined to be of the following complexity level: LOW   Pain Rating:  None complaints     Activity Tolerance:   Good    After treatment patient left in no apparent distress:    Supine in bedin ED, call bell within reach     COMMUNICATION/EDUCATION:   The patients plan of care was discussed with: Physical therapist and Registered nurse.      Thank you for this referral.  Desean Sandoval OT  Time Calculation: 11 mins

## 2023-03-27 NOTE — DISCHARGE SUMMARY
Hospitalist Discharge Summary     Patient ID:  Jesica Tillman  [id number]  49 y.o.  1935    Admit date: 3/26/2023    Discharge date and time: 3/27/2023    Admission Diagnoses: Dysuria [R30.0]      Discharge Diagnoses: Active Problems:    Leukocytosis (3/26/2023)      Dehydration (3/26/2023)      Hyponatremia (3/26/2023)      HENNA (obstructive sleep apnea) (3/26/2023)      Overview: no CPAP      Hypertension (3/26/2023)      GERD (gastroesophageal reflux disease) (3/26/2023)      Overview: hiatal hernia      Fibromyalgia (3/26/2023)      DM type 2 causing vascular disease ([facility] (3/26/2023)      DM type 2 causing renal disease ([facility] (3/26/2023)      CKD (chronic kidney disease) stage 3, GFR 30-59 ml/min ([facility]) (3/26/2023)      Chronic pain (3/26/2023)      Overview: both legs/had 2 ESIs in back/has 2 bad discs in back      Arthritis (3/26/2023)      Anxiety and depression (3/26/2023)      Dysuria (3/26/2023)      Nausea (3/26/2023)      Diarrhea (3/26/2023)         Urethritis  Possible UTI  Chronic dysuria  Nausea   GERD (gastroesophageal reflux disease)    Hiatal hernia    Diarrhea  Dehydration   Hyponatremia    CKD (chronic kidney disease) stage 4a  HTN  Chronic pain   Arthritis   Anxiety and depression   Insomnia  Fibromyalgia   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 Nando De La Rosa is a 82 y.o.  female with history of chronic dysuria, diabetes, asthma, hypertension, and CKD stage IV presenting with acute worsening of dysuria and nausea. Patient had a recent diarrhea illness. She was found to have a urinalysis positive for leukocyte esterase and epithelial cells and negative for bacteria. Repeat clean-catch UA showed the same. Patient was given a dose of ceftriaxone empirically and then started on Pyridium. She had leukocytosis of 12,000 which decreased to 8000 with antibiotics.   Urology was consulted, the recommendation was to continue with antibiotics and tailor pending cultures. Patient has a stated allergy to penicillin and Cipro. However she tolerated the Rocephin. We will proceed with 7 days of Keflex. We will continue Azo. Patient will follow-up with urogyn for possible cystoscopy. Patient had an KIKI on CKD stage III with a creatinine as high as 1.65 and trended down to 1.28 with IV fluids. We will discontinue Mobic as well as HCTZ and can begin lisinopril instead due to her history of CKD. She will continue spironolactone for now. Resume home glipizide for diabetes. She was discharged home in stable improved condition. Other problems:    Nausea / GERD (gastroesophageal reflux disease) / Hiatal hernia / Diarrhea - POA, mild, and likely due to doxycycline. Start clear det and ADAT. Continue sucralfate and PPI     Dehydration / Hyponatremia / CKD (chronic kidney disease) stage 3 - POA, likely some IVVD fro poor PO intake and GI loss. Hydrate overnight. Chronic pain / Arthritis - POA, stable. She has stimulators and take Ultram at home. Hold NSAIDS in setting of dehydration. Anxiety and depression / Insomnia / Fibromyalgia - POA, currently on trazodone and clonazepam.  Previously could not tolerate escitalopram and Wellbutrin. The anxiety dx likely contributes to her sensation of pain, nausea and dysuria. Asthma - POA, stable. For her Symbicort I will substitute Brovana and Pulmicort. Prn leonarda     HENNA (obstructive sleep apnea) - She is not on CPAP         PCP: Ayo Bal MD     Consults: Urology    Condition of patient at discharge: improved    Discharge Exam:     Visit Vitals  BP (!) 139/45   Pulse 75   Temp 99.8 °F (37.7 °C)   Resp 14   Ht 5' 4\" (1.626 m)   Wt 61.2 kg (135 lb)   SpO2 99%   BMI 23.17 kg/m²        General:   No acute distress, resting comfortably in bed. Heart:  RRR, No MRG  Lungs:  CTAB. Non-labored breathing. Abdomen:  Soft . Nondistended. NTTP.   BS present. Extremities:  No edema. Neuro:  Alert and interactive. No focal deficits. Disposition: home    Current Discharge Medication List        START taking these medications    Details   phenazopyridine (Azo Urinary Pain Relief) 95 mg tab Take 1 Tablet by mouth three (3) times daily. Qty: 90 Tablet, Refills: 0  Start date: 3/27/2023      cephALEXin (Keflex) 500 mg capsule Take 1 Capsule by mouth two (2) times a day for 7 days. Qty: 14 Capsule, Refills: 0  Start date: 3/27/2023, End date: 4/3/2023      lisinopriL (PRINIVIL, ZESTRIL) 10 mg tablet Take 1 Tablet by mouth daily. Qty: 30 Tablet, Refills: 0  Start date: 3/27/2023           CONTINUE these medications which have NOT CHANGED    Details   sucralfate (CARAFATE) 1 gram tablet       traZODone (DESYREL) 100 mg tablet Take 100 mg by mouth nightly. budesonide-formoteroL (SYMBICORT) 160-4.5 mcg/actuation HFAA Take 2 Puffs by inhalation. clonazePAM (KlonoPIN) 0.5 mg tablet TAKE 1 TABLET BY MOUTH TWICE A DAY      traMADoL (ULTRAM) 50 mg tablet TAKE 1 TABLET BY MOUTH TWICE A DAY      glipiZIDE (GLUCOTROL) 5 mg tablet Take 5 mg by mouth daily. 2 tabs in morning, 1 tab at bedtime      spironolactone (ALDACTONE) 25 mg tablet Take 25 mg by mouth daily. STOP taking these medications       OTHER Comments:   Reason for Stopping:         triamcinolone acetonide (KENALOG) 0.1 % ointment Comments:   Reason for Stopping:         hydroCHLOROthiazide (HYDRODIURIL) 25 mg tablet Comments:   Reason for Stopping:         meloxicam (MOBIC) 15 mg tablet Comments:   Reason for Stopping:                I have reviewed discharge instructions with the patient. The patient verbalized understanding.      Approximate time spent in patient care on day of discharge: 35 mins    Signed:  Renny Mayen MD  3/27/2023  3:02 PM

## 2023-03-27 NOTE — CONSULTS
New Urology Consult Note    Patient: Cary Duke MRN: [id number]  SSN: [id number]    YOB: 1935  Age: 82 y.o. Sex: female            Assessment:Plan:     WBC 8.1 (12)   Cr 1.28 (1.65)   UA moderate epithelials and large leuk esterase, WBC 10-20. CT abd/pelv images personally reviewed, revealing diverticulosis of the sigmoid colon without evidence of inflammation, no hydronephrosis. Plan:   Concern for UTI, dysuria   -Started tx for UTI 4 days ago per PCP. UA here with moderate epithelial cells. Adjust abx per final culture results.   -Bladder scan PVR given suprapubic tenderness on exam. Dahl if PVR >400ml   -If she is truly infected, dysuria may be worse than baseline. She does struggle with chronic dysuria and has been evaluated in office for this. She was given estrace and pyridium of which she stopped- recommend restarting. Ultimately, given persistent symptoms she needs to return to office for consideration of cystoscopy for further evaluation. No acute interventions planned. OP FU warranted. Will arrange with urogyn. Signing off. Please call if needed. Thank you for this consult. Please contact [facility] with any further questions/concerns. Seen with Dr. Em Malagon    History of Present Illness:     Chief Complaint:  dysuria     Cary Duke is seen in consultation for reasons noted above at the request of Kelly Rees MD. Admitted to hospital for <principal problem not specified>    This is a 82 y.o. female with a history of anxiety, depression , aortic valve disorder, CKD3, T2DM, fibromyalgia, GERD, HTN, HF, HENNA, hysterectomy, sp sling by Dr. Waldrop Poag, vaginal atrophy, urethral stricture, chronic pelvic pain, dysuria, urinary frequency presented to ER with concerns for UTI due to dysuria x1 month. Recently started doxycycline per PCP for dysuria x4 days and has had diarrhea/nausea/vomiting since.  She was admitted to hospital for concerns of UTI and nausea/vomiting unable to complete oral abx course. Urology consulted for persistent dysuria. She is well known to  by Dr. Martha Savage. She stopped following up as she felt \"I wasn't getting anywhere\" regarding treatment and persistent dysuria. She was prescribed estrace for vaginal atrophy and pyridium for dysuria, both of which she stopped taking due to not feeling much benefit. Known to  primarily by Dr. Jae Shelton. Last seen in office by Dr. Eulalia Alfaro 10/2022. She did return to FU as scheduled in December. Full note as follows:   \"Alice Martínez is an 82year old female who presents today for \"UTI \". She returns for follow-up. She is here for urgency, burning symptoms. UA reviews, urine culture sent and options discussed for management. UA today is clear and no suggestion of infection. PVR 0cc today. She is followed by Dr. Martha Savage since 2015 for pressure. She is status post hysterectomy with the ovaries left in situ. Also underwent cholecystectomy and appendectomy. In 2007 she underwent sling with Dr. Valerie Lofton. estrogen cream has not been helpful.      PAST MEDICAL HISTORY:     Allergies: PENICILLIN (Critical)  MACRODANTIN (Critical)  SULFA (Critical)  CODEINE (Critical)  * ESCITALOPRAM (Critical)  CEPHALEXIN (Moderate)  * MIRTAZAPINE (Mild)     Medications: Estrace 0.01% (0.1 mg/gram) cream (estradiol) Apply to affected area three times a week as directed a pea sized amount  * diclofenac submicronized 18 mg capsule (diclofenac submicronized)   Ventolin HFA 90 mcg/actuation HFA aerosol inhaler (albuterol sulfate) as directed   multivitamin tablet (multivitamin) once a day   hydrochlorothiazide 25 mg tablet (hydrochlorothiazide) TAKE 1 TABLET BY MOUTH EVERY MORNING  tramadol 50 mg tablet (tramadol) TAKE 1 TABLET BY MOUTH TWICE DAILY FOR FIBROMYLGIA  glipizide 10 mg tablet (glipizide) TAKE 1 TABLET BY MOUTH TWICE A DAY  trazodone 50 mg tablet (trazodone) TAKE 2 TABLETS BY MOUTH EVERY DAY AT BEDTIME  Symbicort 160-4.5 mcg/actuation HFA aerosol inhaler (budesonide-formoterol) 1 puff twice a day   Co Q-10 400 mg capsule (coenzyme q10) once a day      Problems: Urethritis (ICD-597.80) (PVH81-D90.2)  Urinary tract infection (UTI) (ICD-599.0) (QIH31-J51.0)  Uterine adnexal cyst (ICD-629.89) (BRK44-I12.8)  Syncope (ICD-780.2) (MKI45-K91)  Urinary urgency (XXZ-095.48) (PQC94-I02.15)  Vaginal irritation (ICD-623.9) (XDS02-X82.8)  Vaginal atrophy (ICD-627.3) (FKI54-G65.2)  Acute Urinary Tract Infection (UTI) (ICD-599.0) (HKI02-J80.0)  Ovarian cyst, left (ICD-620.2) (MXC84-V75.20)  Constipation (ICD-564.00) (ENM22-K27.00)  789.39 ABD/PELVIC SWELLING MASS/LUMP OTH SPEC SITE (ICD-789.39) (ZBW11-W71.09)  MUSCLE PAIN (ICD-729.1) (VEY69-Q07.1)  RECTOCELE (ICD-618.04) (OAD46-G45.6)  788.21 SYMPTOM, INCOMPLETE BLADDER EMPTYING (ICD-788.21) (OXZ49-O79.14)  788.62 SLOWING, URINARY STREAM (ICD-788.62) (DYK28-V20.19)  788.1 DYSURIA (ICD-788.1) (UCM29-E94.0)  PELVIC PAIN (ICD-625.9) (UQB28-J30.2)  CYSTOCELE MIDLINE (ICD-618.01) (FAY37-L91.10)  618.00 PROLAPSE, VAGINAL WALL NOS (ICD-618.00) (GGZ82-X73.10)  788.41 FREQUENCY, URINARY (ICD-788.41) (BHO74-I97.0)  625.6 INCONTINENCE, FEMALE STRESS (ICD-625.6) (HHH03-G65.3)  598.00 STRICTURE, URETHRAL DUE TO INFECTION NOS (ICD-598.00) (ZGK15-F01.111)     Illnesses: Heart Disease, Bowel Problems, and Kidney Problems. DENIES: Pacemaker/Defibrillator, Lung Disease, Diabetes, High Blood Pressure, Stroke/Seizure, Bleeding Problems, HIV, Hepatitis, or Cancer. Surgeries: Bladder Surgery, Open Heart Surgery, Heart Valve Surgery, Gallbladder Surgery, Cataract Surgery, Cystocele Repair (Dropped Bladder), D & C, Hysterectomy, and Other Abdominal Surgery. Family History: Kidney Disease. DENIES: Kidney cancer, Kidney stones, Breast cancer, Uterine cancer, Cervical cancer, Ovarian cancer. Social History: Retired. . Smoking status: never smoker.  Does not drink alcohol. System Review: Admits to: Difficulty Walking, Dry Skin, Lower Extremity Weakness, Shortness of Breath, and Unexplained Weight Loss. DENIES: Impaired Sex Drive, Easy Bleeding, Rash. EXAMINATION: Appearance: well-developed NAD Respiratory Effort: breathing easily Lower Extremity: no edema Abdomen/Flank: soft non-tender without masses; no CVA tenderness Liver/Spleen: no organomegaly Hernia: no ventral hernia      DIAGNOSTIC STUDIES:  Renal US Impression 1/05/2022:  1. Normal ultrasound of the kidneys. 2. Bladder only partially filled limiting evaluation. URINALYSIS from Voided specimen  Urine Dip: pH: 6.0, Bld: Neg, LE: Neg, Nit: Neg, Prot: Neg, Ket: Neg, Gluc: Neg  Urine Micro: WBC: 0, RBC: 0, Bacteria: 0     POST-VOID RESIDUAL  US PVR (10/21/2022):  0 ccs      IMPRESSION:     1. URINARY TRACT INFECTION (UTI) (HLC11-V71.0)     2. URETHRITIS (XIC01-O43.2)     3. 788.1 DYSURIA (BOQ85-E13.0)     PLAN: Urethritis, estrogen cream does not appear to be helping, recommended Azo. History of sling procedure no evidence of UTI with her noted history above. Consider cystoscopy if symptoms worsen, reassurance provided. Dr Agueda Fallon.  Is sling too tight? UDS.      cc: Jose Manuel Lopez MD  [employer]  E&M Service     Upcoming Orders  Return Office Visit - with  Nevaeh Cerrato at previously scheduled appointment  Office [facility] 12/02/2022  10:20 AM\"  Subjective     Past Medical History  Past Medical History:   Diagnosis Date    Anxiety and depression     Aortic valve disorders 04/07/2011    Arthritis     Chronic pain     both legs/had 2 ESIs in back/has 2 bad discs in back    CKD (chronic kidney disease) stage 3, GFR 30-59 ml/min (HCC)     DM type 2 causing renal disease ([address]     DM type 2 causing vascular disease (HCC)     Fibromyalgia     GERD (gastroesophageal reflux disease)     hiatal hernia    Hypertension     Left heart failure ([facility] 04/07/2011    HENNA (obstructive sleep apnea)     no CPAP Past Surgical History:   Past Surgical History:   Procedure Laterality Date    COLONOSCOPY N/A 10/27/2016    COLONOSCOPY performed by Caridad Robb MD at [facility] ENDOSCOPY    [address] [place] [address] Dr      done with cholecystectomy    HX CHOLECYSTECTOMY      HX HYSTERECTOMY      HX ORTHOPAEDIC      bilateral partial knee replacements - inside of both knees replaced    HX ORTHOPAEDIC      bilateral trigger finger release - total of 4 - twice on middle finger on each hand    HX OTHER SURGICAL      EGD/colonoscopy x 3 - colon polyp    HX TONSILLECTOMY      HX WISDOM TEETH EXTRACTION      IMPLANT BREAST SILICONE/EQ Bilateral     Removed are 3 different sets/removed - no implants at this time    DE UNLISTED PROCEDURE CARDIAC SURGERY  2013    aortic valve replacement - cow valve       Medication:  Current Facility-Administered Medications   Medication Dose Route Frequency Provider Last Rate Last Admin    calcium gluconate 1 gram in sodium chloride (ISO-OSM) 50 mL infusion  1 g IntraVENous ONCE Jeramy Blanco MD        phenazopyridine (PYRIDIUM) tablet 100 mg  100 mg Oral TIDPC Suellen Shea MD   100 mg at 03/27/23 0937    arformoteroL (BROVANA) neb solution 15 mcg  15 mcg Nebulization BID RT Suellen Shea MD   15 mcg at 03/27/23 0737    budesonide (PULMICORT) 500 mcg/2 ml nebulizer suspension  500 mcg Nebulization BID RT Suellen Shea MD   500 mcg at 03/27/23 0737    albuterol-ipratropium (DUO-NEB) 2.5 MG-0.5 MG/3 ML  3 mL Nebulization Q6H PRN Suellen Shea MD        clonazePAM (KlonoPIN) tablet 0.5 mg  0.5 mg Oral BID Suellen Shea MD   0.5 mg at 03/27/23 3667    traMADoL (ULTRAM) tablet 50 mg  50 mg Oral BID Suellen Shea MD   50 mg at 03/27/23 0419    traZODone (DESYREL) tablet 100 mg  100 mg Oral QHS Suellen Shea MD   100 mg at 03/26/23 2339    acetaminophen (TYLENOL) tablet 650 mg  650 mg Oral Q6H PRN Suellen Shea MD   650 mg at 03/27/23 4271    Or    acetaminophen (TYLENOL) suppository 650 mg  650 mg Rectal Q6H PRN Mushtaq Thorpe MD        polyethylene glycol (MIRALAX) packet 17 g  17 g Oral DAILY PRN Mushtaq Thorpe MD        ondansetron (ZOFRAN ODT) tablet 4 mg  4 mg Oral Q8H PRN Mushtaq Thorpe MD        Or    [facility] injection 4 mg  4 mg IntraVENous Q6H PRN Mushtqa Thorpe MD        enoxaparin (LOVENOX) injection 30 mg  30 mg SubCUTAneous DAILY Mushtaq Thorpe MD   30 mg at 03/27/23 0081    L.acidophilus-paracasei-S.thermophil-bifidobacter (RISAQUAD) 8 billion cell capsule  1 Capsule Oral DAILY Mushtaq Thorpe MD   1 Capsule at 03/27/23 6907    sucralfate (CARAFATE) tablet 1 g  1 g Oral AC&HS Mushtaq Thorpe MD   1 g at 03/27/23 0912     Current Outpatient Medications   Medication Sig Dispense Refill    sucralfate (CARAFATE) 1 gram tablet       OTHER Estradiol 0.01% daily      triamcinolone acetonide (KENALOG) 0.1 % ointment Apply  to affected area two (2) times a day. use thin layer on vulva x 10 days then every day. 30 g 0    traZODone (DESYREL) 100 mg tablet Take 100 mg by mouth nightly. budesonide-formoteroL (SYMBICORT) 160-4.5 mcg/actuation HFAA Take 2 Puffs by inhalation. clonazePAM (KlonoPIN) 0.5 mg tablet TAKE 1 TABLET BY MOUTH TWICE A DAY      traMADoL (ULTRAM) 50 mg tablet TAKE 1 TABLET BY MOUTH TWICE A DAY      hydroCHLOROthiazide (HYDRODIURIL) 25 mg tablet Take 25 mg by mouth daily. glipiZIDE (GLUCOTROL) 5 mg tablet Take 5 mg by mouth daily. 2 tabs in morning, 1 tab at bedtime      spironolactone (ALDACTONE) 25 mg tablet Take 25 mg by mouth daily. meloxicam (MOBIC) 15 mg tablet Take 15 mg by mouth daily. (Patient not taking: Reported on 2/17/2023)         Allergies:   Allergies   Allergen Reactions    Codeine Itching    Buprenorphine Hcl Unknown (comments)    Ciprofloxacin Diarrhea    Escitalopram Oxalate Unknown (comments)    Mirtazapine Other (comments)    Nitrofurantoin Monohyd/M-Cryst Other (comments) and Unknown (comments)     Muscle weakness    Pcn [Penicillins] Rash     Itching, facial swelling, pounding heart    Wellbutrin [Bupropion] Other (comments)       Social History:  Social History     Tobacco Use    Smoking status: Former     Types: Cigarettes     Quit date: 8/15/1984     Years since quittin.6    Smokeless tobacco: Never    Tobacco comments:     former cigarette smoker - smoked for 10 yrs   Substance Use Topics    Alcohol use: No    Drug use: No       Family History  Family History   Problem Relation Age of Onset    Colon Cancer Mother     Heart Failure Mother     Diabetes Father     Heart Disease Sister         pumping at 10%    Other Sister         aneurysm in heart    Diabetes Sister     Diabetes Daughter        Review of Systems  Unchanged from admitting provider note from 3/26/23 other than HPI.     Objective:     Vital signs in last 24 hours:  Visit Vitals  BP (!) 139/45   Pulse 75   Temp 99.8 °F (37.7 °C)   Resp 14   Ht 5' 4\" (1.626 m)   Wt 61.2 kg (135 lb)   SpO2 99%   BMI 23.17 kg/m²       Intake/Output last 3 shifts:  Date 23 07 - 23 0659 23 07 - 23 0659   Shift 5788-5976 2097-2385 24 Hour Total 4230-7335 8248-4370 24 Hour Total   INTAKE   I.V.(mL/kg/hr)  1500(2) 1500(1)        Volume (sodium chloride 0.9 % bolus infusion 500 mL)  500 500        Volume (sodium chloride 0.9 % bolus infusion 1,000 mL)  1000 1000      Shift Total(mL/kg)  1500(24.5) 1500(24.5)      OUTPUT   Shift Total(mL/kg)         NET  1500 1500      Weight (kg) 61.2 61.2 61.2 61.2 61.2 61.2       Physical Exam  General Appearance: NAD, awake  HENT: atraumatic, normal ears  Cardiovascular: not tachycardic, no distress  Respiratory: no distress, room air  Abdomen: soft, epigastric tenderness, no suprapubic fullness, + SP tenderness  : no CVA tenderness, voiding independently   Extremities: moves all  Musculoskeletal: normal alignment of neck and head  Neuro: A&OX3, no focal neurological deficits  Mood/Affect: appropriate, pleasant    Lab/Imaging Review:       Most Recent Labs:  Lab Results   Component Value Date/Time    WBC 8.1 03/27/2023 01:57 AM    HGB 13.5 03/27/2023 01:57 AM    HCT 40.3 03/27/2023 01:57 AM    PLATELET 865 09/72/3020 01:57 AM    MCV 92.2 03/27/2023 01:57 AM        Lab Results   Component Value Date/Time    Sodium 136 03/27/2023 01:57 AM    Potassium 3.3 (L) 03/27/2023 01:57 AM    Chloride 108 03/27/2023 01:57 AM    CO2 21 03/27/2023 01:57 AM    Anion gap 7 03/27/2023 01:57 AM    Glucose 195 (H) 03/27/2023 01:57 AM    BUN 20 03/27/2023 01:57 AM    Creatinine 1.28 (H) 03/27/2023 01:57 AM    BUN/Creatinine ratio 16 03/27/2023 01:57 AM    GFR est AA 35 (L) 09/30/2021 03:30 PM    GFR est non-AA 29 (L) 09/30/2021 03:30 PM    Calcium 7.8 (L) 03/27/2023 01:57 AM    Bilirubin, total 0.8 03/27/2023 01:57 AM    Alk.  phosphatase 48 03/27/2023 01:57 AM    Protein, total 5.8 (L) 03/27/2023 01:57 AM    Albumin 3.0 (L) 03/27/2023 01:57 AM    Globulin 2.8 03/27/2023 01:57 AM    A-G Ratio 1.1 03/27/2023 01:57 AM    ALT (SGPT) 32 03/27/2023 01:57 AM        No results found for: PSA, PSA2, Berenice Sizer, PSAR3, [id number], [id number], 42160, PSAEXT     COAGS:  No results found for: APTT, PTP, INR, INREXT    No results found for: HBA1C, WQZ5OBSL, LPW8OKHG     No results found for: CPK, RCK1, RCK2, RCK3, RCK4, CKMB, CKNDX, CKND1, TROPT, TROIQ, BNPP, BNP       Urine/Blood Cultures:  Results       Procedure Component Value Units Date/Time    CULTURE, URINE [[id number]] Collected: 03/27/23 0157    Order Status: Sent Specimen: Cath Urine Updated: 03/27/23 0820    CULTURE, URINE [[id number]] Collected: 03/26/23 2330    Order Status: Canceled Specimen: Urine from Clean catch     CULTURE, URINE [[id number]]     Order Status: Canceled Specimen: Urine from [address] [place] [address] [[id number]] Collected: 03/26/23 2135    Order Status: Completed Specimen: Urine Updated: 03/26/23 2140 Urine culture hold       Urine on hold in Microbiology dept for 2 days. If unpreserved urine is submitted, it cannot be used for addtional testing after 24 hours, recollection will be required. IMAGING:  CT ABD PELV WO CONT    Result Date: 3/26/2023  EXAM: CT ABD PELV WO CONT INDICATION: lower abdominal pain, leukocytosis. Initially presented for UTI complaints but now endorsing severe lower abdominal pain. GFR too poor for contrast COMPARISON: September 2021 IV CONTRAST: None. ORAL CONTRAST: None TECHNIQUE: Thin axial images were obtained through the abdomen and pelvis. Coronal and sagittal reformats were generated. CT dose reduction was achieved through use of a standardized protocol tailored for this examination and automatic exposure control for dose modulation. The absence of intravenous contrast material reduces the sensitivity for evaluation of the vasculature and solid organs. FINDINGS: LOWER THORAX: No significant abnormality in the incidentally imaged lower chest. LIVER: No mass. BILIARY TREE: Cholecystectomy. CBD is not dilated. SPLEEN: within normal limits. PANCREAS: No focal abnormality. ADRENALS: Unremarkable. KIDNEYS/URETERS: No calculus or hydronephrosis. STOMACH: Small hiatal hernia. SMALL BOWEL: No dilatation or wall thickening. COLON: Diverticulosis of the sigmoid colon without evidence of active inflammation. APPENDIX: Not seen PERITONEUM: No ascites or pneumoperitoneum. RETROPERITONEUM: No lymphadenopathy or aortic aneurysm. REPRODUCTIVE ORGANS: Hysterectomy URINARY BLADDER: No mass or calculus. BONES: No destructive bone lesion. ABDOMINAL WALL: No mass or hernia. ADDITIONAL COMMENTS: N/A     1. Diverticulosis of the sigmoid colon without evidence of active inflammation. 2.  No hydronephrosis or nephrolithiasis. 3.  No bowel obstruction or inflammation.          Signed By: Carylon Snellen, NP  - March 27, 2023

## 2023-03-27 NOTE — ED NOTES
Bedside and Verbal shift change report given to Irais Armas (oncoming nurse) by Carmen Hanson (offgoing nurse). Report included the following information SBAR, ED Summary, Procedure Summary and MAR.

## 2023-03-27 NOTE — DISCHARGE INSTRUCTIONS
HOSPITALIST DISCHARGE INSTRUCTIONS  NAME: Farrah Trinh   :  1935   MRN:  [id number]     Date/Time:  3/27/2023 2:51 PM    ADMIT DATE: 3/26/2023     DISCHARGE DATE: 3/27/2023     ADMITTING DIAGNOSIS:  Urethritis  Possible UTI  Chronic dysuria      DISCHARGE DIAGNOSIS:  You were admitted for evaluation and treatment of the above. MEDICATIONS:    It is important that you take the medication exactly as they are prescribed. Keep your medication in the bottles provided by the pharmacist and keep a list of the medication names, dosages, and times to be taken in your wallet. Do not take other medications without consulting your doctor. DIET:  regular    ACTIVITY:  as tolerated    OTHER INSTRUCTIONS:    Seek medical attention for fever, worsening dysuria, or flank pain      If you experience any of the following symptoms then please call your primary care physician or return to the emergency room if you cannot get hold of your doctor:  Fever, chills, nausea, vomiting, diarrhea, change in mentation, falling, bleeding, shortness of breath    Follow Up:  Please call and set up an appointment to see them in 1 week. [facility]  [address] [place] [address]  Follow up on 3/31/2023  Dr. Lita Camp at [place] [address] obtained by :  I understand that if any problems occur once I am at home I am to contact my physician. I understand and acknowledge receipt of the instructions indicated above.                                                                                                                                            Physician's or R.N.'s Signature                                                                  Date/Time                                                                                                                                              Patient or Representative Signature                                                          Date/Time Patient or Representative Signature                                                          Date/Time      Signed By: Rashaun Fatima MD     March 27, 2023

## 2023-03-27 NOTE — PROGRESS NOTES
PHYSICAL THERAPY EVALUATION/DISCHARGE  Patient: Annita Reyes (51 y.o. female)  Date: 3/27/2023  Primary Diagnosis: Dysuria [R30.0]       Precautions: universal         ASSESSMENT  Based on the objective data described below, the patient presents with independent with ambulation without assistive device  and independent with all functional mobility. Reviewed all safety precaution and home exercise program with the patient, verbalized understanding, clear to go home per Physical Therapy perspective. Skilled physical therapy is not indicated at this time. .    Functional Outcome Measure: The patient scored 24/24 on the [facility] outcome measure . Other factors to consider for discharge: none     Further skilled acute physical therapy is not indicated at this time. PLAN :  Recommendation for discharge: (in order for the patient to meet his/her long term goals)  No skilled physical therapy/ follow up rehabilitation needs identified at this time. This discharge recommendation:  Has been made in collaboration with the attending provider and/or case management    IF patient discharges home will need the following DME: none       SUBJECTIVE:   Patient stated [place].     OBJECTIVE DATA SUMMARY:   HISTORY:    Past Medical History:   Diagnosis Date    Anxiety and depression     Aortic valve disorders 04/07/2011    Arthritis     Chronic pain     both legs/had 2 ESIs in back/has 2 bad discs in back    CKD (chronic kidney disease) stage 3, GFR 30-59 ml/min (HCC)     DM type 2 causing renal disease (HCC)     DM type 2 causing vascular disease (HCC)     Fibromyalgia     GERD (gastroesophageal reflux disease)     hiatal hernia    Hypertension     Left heart failure ([address] 04/07/2011    HENNA (obstructive sleep apnea)     no CPAP     Past Surgical History:   Procedure Laterality Date    COLONOSCOPY N/A 10/27/2016    COLONOSCOPY performed by Zhou Orosco MD at [address] [place] [address]      done with cholecystectomy    HX CHOLECYSTECTOMY      HX HYSTERECTOMY      HX ORTHOPAEDIC      bilateral partial knee replacements - inside of both knees replaced    HX ORTHOPAEDIC      bilateral trigger finger release - total of 4 - twice on middle finger on each hand    HX OTHER SURGICAL      EGD/colonoscopy x 3 - colon polyp    HX TONSILLECTOMY      HX WISDOM TEETH EXTRACTION      IMPLANT BREAST SILICONE/EQ Bilateral     Removed are 3 different sets/removed - no implants at this time    [place] UNLISTED PROCEDURE CARDIAC SURGERY  2013    aortic valve replacement - cow valve       Prior level of function: Independent community ambulator without assistive device. Personal factors and/or comorbidities impacting plan of care:     Home Situation  Home Environment: Private residence  # Steps to Enter: 5  Rails to Enter: Yes  One/Two Story Residence: One story  Living Alone: No  Support Systems: Spouse/Significant Other  Patient Expects to be Discharged to[de-identified] Home  Current DME Used/Available at Home: Shower chair  Tub or Shower Type: Shower    EXAMINATION/PRESENTATION/DECISION MAKING:   Critical Behavior:  Neurologic State: Alert  Orientation Level: Oriented X4  Cognition: Appropriate decision making, Follows commands     Hearing:       Range Of Motion:  AROM: Within functional limits           PROM: Within functional limits           Strength:    Strength: Within functional limits                    Tone & Sensation:                                  Coordination:  Coordination: Within functional limits  Vision:      Functional Mobility:  Bed Mobility:  Rolling: Independent  Supine to Sit: Independent  Sit to Supine: Independent  Scooting: Independent  Transfers:  Sit to Stand: Independent  Stand to Sit: Independent  Stand Pivot Transfers: Independent     Bed to Chair: Independent              Balance:   Sitting: Intact; Without support  Standing: Intact; Without support  Ambulation/Gait Training:  Distance (ft): 50 Feet (ft)     Ambulation - Level of Assistance: Independent     Gait Description (WDL): Within defined limits                                              Therapeutic Exercises:   Educate and instructed patient to continue active range of motion exercise on both legs while up on chair or on bed multiple times. Recommend patient to be up on the chair at least 3 times a day every meal times as tolerated. Functional Measure:  [facility] AM-PAC®      Basic Mobility Inpatient Short Form (6-Clicks) Version 2  How much HELP from another person do you currently need. .. (If the patient hasn't done an activity recently, how much help from another person do you think they would need if they tried?) Total A Lot A Little None   1. Turning from your back to your side while in a flat bed without using bedrails? []  1 []  2 []  3  [x]  4   2. Moving from lying on your back to sitting on the side of a flat bed without using bedrails? []  1 []  2 []  3  [x]  4   3. Moving to and from a bed to a chair (including a wheelchair)? []  1 []  2 []  3  [x]  4   4. Standing up from a chair using your arms (e.g. wheelchair or bedside chair)? []  1 []  2 []  3  [x]  4   5. Walking in hospital room? []  1 []  2 []  3  [x]  4   6. Climbing 3-5 steps with a railing? []  1 []  2 []  3  [x]  4     Raw Score: 24/24                            Cutoff score ?171,2,3 had higher odds of discharging home with home health or need of SNF/IPR. [facility], [facility], [facility] Mathis Aschoff. Validity of the AM-PAC 6-Clicks Inpatient Daily Activity and Basic Mobility Short Forms. Physical Therapy Mar 2014, [phone]) 379-391; DOI: 10.2522/ptj.09115426  2. Jerod Mcgrath. Association of AM-PAC \"6-Clicks\" Basic Mobility and Daily Activity Scores With Discharge Destination. Phys Ther. 2021 Apr 4;101(4):ovac170. doi: 10.1093/ptj/from399. PMID: 37550064.   Yenifer Mejía Delbra Race Activity Measure for Post-Acute Care \"6-Clicks\" Basic Mobility Scores Predict Discharge Destination After Acute Care Hospitalization in Select Patient Groups: A Retrospective, Observational Study. Arch Rehabil Res Clin Transl. 2022;4(3):084163. doi: 10.1016/j.arrct. 6918.734001. PMID: 72219920; PMCID: UOG2740192. 4. Apolinar Moore, Мария WATTERS. AM-PAC Short Forms Manual 4.0. Revised 2020. Physical Therapy Evaluation Charge Determination   History Examination Presentation Decision-Making   LOW Complexity : Zero comorbidities / personal factors that will impact the outcome / POC LOW Complexity : 1-2 Standardized tests and measures addressing body structure, function, activity limitation and / or participation in recreation  LOW Complexity : Stable, uncomplicated  Other outcome measures AMPA  LOW       Based on the above components, the patient evaluation is determined to be of the following complexity level: LOW     Pain Ratin/10    Activity Tolerance:   Good      After treatment patient left in no apparent distress:   Supine in bed, Heels elevated for pressure relief, Call bell within reach, Bed / chair alarm activated, and Side rails x 3    COMMUNICATION/EDUCATION:   The patients plan of care was discussed with: Occupational therapist and Registered nurse. Fall prevention education was provided and the patient/caregiver indicated understanding. Thank you for this referral.  Adin Zarate PT,WCC.    Time Calculation: 28 mins

## 2023-03-27 NOTE — PROGRESS NOTES
Medicare Outpatient Observation Notice (MOON)/ [place] Outpatient Observation Notice (Miguel Cuate) provided to patient/representative with verbal explanation of the notice. Time allotted for questions regarding the notice. Patient /representative provided a completed copy of the MOON/VOON notice. Copy placed on bedside chart.   Ronel Vogel CMS

## 2023-03-27 NOTE — H&P
[facility]  [address] [facility], [facility] [address]  [phone]    Hospitalist Admission Note      NAME:  Pauly Edwards   :   1935   MRN:  [id number]     PCP:  Dunia Han MD     Date/Time of service:  3/26/2023 10:56 PM          Subjective:     CHIEF COMPLAINT: dysuria, nausea     HISTORY OF PRESENT ILLNESS:     Ms. Arsalan Daugherty is a 82 y.o.  female who presented to the Emergency Department complaining of dysuria and nausea. No family present. Her hx is limited by anxiety and depression. Dysuria for a month. Hx of seeing urology in the past, who she states \"they did nothing to help me\". Dysuria did not improve on doxycycline. Nausea and diarrhea after starting doxycycline. Long hx of GI issues. Has seen GI in the past, who she also states \"they did nothing to help me\". ER workup shows only mild dehydration. We will admit her for observation.       Past Medical History:   Diagnosis Date    Anxiety and depression     Aortic valve disorders 2011    Arthritis     Chronic pain     both legs/had 2 ESIs in back/has 2 bad discs in back    CKD (chronic kidney disease) stage 3, GFR 30-59 ml/min (HCC)     DM type 2 causing renal disease (HCC)     DM type 2 causing vascular disease (HCC)     Fibromyalgia     GERD (gastroesophageal reflux disease)     hiatal hernia    Hypertension     Left heart failure ([facility] [address] 2011    HENNA (obstructive sleep apnea)     no CPAP        Past Surgical History:   Procedure Laterality Date    COLONOSCOPY N/A 10/27/2016    COLONOSCOPY performed by Manjinder Obrien MD at [address] [place] [address] Dr      done with cholecystectomy    HX CHOLECYSTECTOMY      HX HYSTERECTOMY      HX ORTHOPAEDIC      bilateral partial knee replacements - inside of both knees replaced    HX ORTHOPAEDIC      bilateral trigger finger release - total of 4 - twice on middle finger on each hand    HX OTHER SURGICAL      EGD/colonoscopy x 3 - colon polyp    HX TONSILLECTOMY      HX WISDOM TEETH EXTRACTION      IMPLANT BREAST SILICONE/EQ Bilateral     Removed are 3 different sets/removed - no implants at this time    [place] UNLISTED PROCEDURE CARDIAC SURGERY  2013    aortic valve replacement - cow valve       Social History     Tobacco Use    Smoking status: Former     Types: Cigarettes     Quit date: 8/15/1984     Years since quittin.6    Smokeless tobacco: Never    Tobacco comments:     former cigarette smoker - smoked for 10 yrs   Substance Use Topics    Alcohol use: No        Family History   Problem Relation Age of Onset    Colon Cancer Mother     Heart Failure Mother     Diabetes Father     Heart Disease Sister         pumping at 10%    Other Sister         aneurysm in heart    Diabetes Sister     Diabetes Daughter       Family hx cannot be fully assessed, since the patient cannot provide information    Allergies   Allergen Reactions    Codeine Itching    Buprenorphine Hcl Unknown (comments)    Ciprofloxacin Diarrhea    Escitalopram Oxalate Unknown (comments)    Mirtazapine Other (comments)    Nitrofurantoin Monohyd/M-Cryst Other (comments) and Unknown (comments)     Muscle weakness    Pcn [Penicillins] Rash     Itching, facial swelling, pounding heart    Wellbutrin [Bupropion] Other (comments)        Prior to Admission medications    Medication Sig Start Date End Date Taking? Authorizing Provider   sucralfate (CARAFATE) 1 gram tablet  23   Provider, Historical   OTHER Estradiol 0.01% daily    Provider, Historical   triamcinolone acetonide (KENALOG) 0.1 % ointment Apply  to affected area two (2) times a day. use thin layer on vulva x 10 days then every day. 23   Chandler Mead MD   traZODone (DESYREL) 100 mg tablet Take 100 mg by mouth nightly. Provider, Historical   budesonide-formoteroL (SYMBICORT) 160-4.5 mcg/actuation HFAA Take 2 Puffs by inhalation.     Provider, Historical   clonazePAM (KlonoPIN) 0.5 mg tablet TAKE 1 TABLET BY MOUTH TWICE A DAY 2/8/21   Provider, Historical   traMADoL (ULTRAM) 50 mg tablet TAKE 1 TABLET BY MOUTH TWICE A DAY 3/5/21   Provider, Historical   hydroCHLOROthiazide (HYDRODIURIL) 25 mg tablet Take 25 mg by mouth daily. Provider, Historical   glipiZIDE (GLUCOTROL) 5 mg tablet Take 5 mg by mouth daily. 2 tabs in morning, 1 tab at bedtime    Provider, Historical   spironolactone (ALDACTONE) 25 mg tablet Take 25 mg by mouth daily. 8/2/17   Provider, Historical   meloxicam (MOBIC) 15 mg tablet Take 15 mg by mouth daily.   Patient not taking: Reported on 2/17/2023 6/30/15   Provider, Historical       Review of Systems:  (bold if positive, if negative)    Gen:  chills, fatigue  Eyes:  ENT:  CVS:  Pulm:  GI:  ausea, emesis, diarrhea,:  dysuriaMS:  Pain, weakness, , arthritis  Skin:  Psych:   depression, anxietyEndo:  Hem:  Renal:  Neuro:  weakness      Objective:      VITALS:    Vital signs reviewed; most recent are:    Visit Vitals  BP (!) 153/86   Pulse 73   Temp 97.2 °F (36.2 °C)   Resp 16   Ht 5' 4\" (1.626 m)   Wt 61.2 kg (135 lb)   SpO2 100%   BMI 23.17 kg/m²     SpO2 Readings from Last 6 Encounters:   03/26/23 100%   11/30/21 97%   09/30/21 100%   01/22/20 93%   03/27/18 97%   10/27/16 96%          Intake/Output Summary (Last 24 hours) at 3/26/2023 2256  Last data filed at 3/26/2023 1956  Gross per 24 hour   Intake 500 ml   Output --   Net 500 ml        Exam:     Physical Exam:    Gen:  Well-developed, well-nourished, in mild acute distress  HEENT:  Pink conjunctivae, PERRL, hearing intact to voice, dry mucous membranes  Neck:  Supple, without masses, thyroid non-tender  Resp:  No accessory muscle use, clear breath sounds without wheezes rales or rhonchi  Card:  No murmurs, normal S1, S2 without thrills, bruits or peripheral edema  Abd:  Soft, non-tender, non-distended, normoactive bowel sounds are present, no mass  Lymph:  No cervical or inguinal adenopathy  Musc:  No cyanosis or clubbing  Skin:  No rashes or ulcers, skin turgor is reduced  Neuro:  Cranial nerves are grossly intact, general motor weakness, follows commands   Psych:  Poor insight, oriented to person, place and time, anxious  Labs:    Recent Labs     03/26/23 1845   WBC 12.0*   HGB 17.2*   HCT 50.5*        Recent Labs     03/26/23 1845   *   K 4.2      CO2 23   *   BUN 23*   CREA 1.65*   CA 9.8   MG 1.8   ALB 3.8   TBILI 1.0   ALT 34     No results found for: GLUCPOC  No results for input(s): PH, PCO2, PO2, HCO3, FIO2 in the last 72 hours. No results for input(s): INR, INREXT in the last 72 hours. All Micro Results       Procedure Component Value Units Date/Time    CULTURE, URINE [[id number]]     Order Status: Sent Specimen: Cath Urine     CULTURE, URINE [[id number]]     Order Status: Sent Specimen: Urine from Clean catch     URINE CULTURE HOLD SAMPLE [[id number]] Collected: 03/26/23 2135    Order Status: Completed Specimen: Urine Updated: 03/26/23 2140     Urine culture hold       Urine on hold in Microbiology dept for 2 days. If unpreserved urine is submitted, it cannot be used for addtional testing after 24 hours, recollection will be required. I have reviewed previous records       Assessment and Plan:      Dysuria / Leukocytosis - Initial ER sample is a \"clean catch\" without strong evidence of UTI, when considered in this setting of elderly woman with inflamed urethra and recent diarrhea. Consult urology. Check sterile UA//Cx sample. No abx yet, I doubt UTI. I wonder if incontinence has led to urethral irritation. WBC elevation is minimal, without any left shift    Nausea / GERD (gastroesophageal reflux disease) / Hiatal hernia / Diarrhea - POA, mild, and likely due to doxycycline. Start clear det and ADAT. Continue sucralfate and PPI    Dehydration / Hyponatremia / CKD (chronic kidney disease) stage 3 - POA, likely some IVVD fro poor PO intake and GI loss. Hydrate overnight.     Chronic pain / Arthritis - POA, stable. She has stimulators and take Ultram at home. Hold NSAIDS in setting of dehydration. Anxiety and depression / Insomnia / Fibromyalgia - POA, currently on trazodone and clonazepam.  Previously could not tolerate escitalopram and Wellbutrin. The anxiety dx likely contributes to her sensation of pain, nausea and dysuria. DM type 2 causing vascular and renal disease - Diabetic diet and counseling. SSI per protocol. Hold home glipizide due to her poor appetite. Check A1c. Asthma - POA, stable. For her Symbicort I will substitute Brovana and Pulmicort. Prn duonebs    HENNA (obstructive sleep apnea) - She is not on CPAP    Hypertension - Due to dehydration I will hold spironolactone and HCTZ. Prn Norvasc or ACE at discharge may be better    Telemetry reviewed:   normal sinus rhythm    Risk of deterioration: high      Total time spent with patient: 48 Minutes I personally reviewed chart, notes, data and current medications in the medical record. I have personally examined and treated the patient at bedside during this period. To assist coordination of care and communication with nursing and staff, this note may be preliminary early in the day, but finalized by end of the day.                  Care Plan discussed with: Patient, Nursing Staff, and >50% of time spent in counseling and coordination of care    Discussed:  Care Plan and D/C Planning       ___________________________________________________    Attending Physician: Shelby Medeiros MD

## 2023-03-28 LAB
BACTERIA SPEC CULT: NORMAL
SERVICE CMNT-IMP: NORMAL

## 2023-04-25 PROBLEM — E86.0 DEHYDRATION: Status: RESOLVED | Noted: 2023-03-26 | Resolved: 2023-04-25

## 2025-09-01 ENCOUNTER — TRANSCRIBE ORDERS (OUTPATIENT)
Facility: HOSPITAL | Age: 89
End: 2025-09-01

## 2025-09-01 DIAGNOSIS — R10.13 EPIGASTRIC PAIN: ICD-10-CM

## 2025-09-01 DIAGNOSIS — R11.0 NAUSEA: Primary | ICD-10-CM

## 2025-09-07 ENCOUNTER — TRANSCRIBE ORDERS (OUTPATIENT)
Facility: HOSPITAL | Age: 89
End: 2025-09-07

## 2025-09-07 DIAGNOSIS — R11.0 NAUSEA: Primary | ICD-10-CM

## (undated) DEVICE — CONTAINER SPEC 20 ML LID NEUT BUFF FORMALIN 10 % POLYPR STS

## (undated) DEVICE — FORCEPS BX L240CM JAW DIA2.8MM L CAP W/ NDL MIC MESH TOOTH

## (undated) DEVICE — KENDALL RADIOLUCENT FOAM MONITORING ELECTRODE -RECTANGULAR SHAPE: Brand: KENDALL

## (undated) DEVICE — BAG BELONG PT PERS CLEAR HANDL

## (undated) DEVICE — NEEDLE HYPO 18GA L1.5IN PNK S STL HUB POLYPR SHLD REG BVL

## (undated) DEVICE — BAG SPEC BIOHZD LF 2MIL 6X10IN -- CONVERT TO ITEM 357326

## (undated) DEVICE — SOLIDIFIER FLUID 3000 CC ABSORB

## (undated) DEVICE — TUBING HYDR IRR --

## (undated) DEVICE — CATH IV AUTOGRD BC BLU 22GA 25 -- INSYTE

## (undated) DEVICE — 1200 GUARD II KIT W/5MM TUBE W/O VAC TUBE: Brand: GUARDIAN

## (undated) DEVICE — SET ADMIN 16ML TBNG L100IN 2 Y INJ SITE IV PIGGY BK DISP

## (undated) DEVICE — Z DISCONTINUED NO SUB IDED SET EXTN W/ 4 W STPCOCK M SPIN LOK 36IN

## (undated) DEVICE — CANN NASAL O2 CAPNOGRAPHY AD -- FILTERLINE

## (undated) DEVICE — BW-412T DISP COMBO CLEANING BRUSH: Brand: SINGLE USE COMBINATION CLEANING BRUSH

## (undated) DEVICE — Z CONVERTED USE 2274299 CUFF BLD PRESSURE LNG MED AD 25-35 CM ARM FLEXIPORT DISP

## (undated) DEVICE — ENDO CARRY-ON PROCEDURE KIT INCLUDES ENZYMATIC SPONGE, GAUZE, BIOHAZARD LABEL, TRAY, LUBRICANT, DIRTY SCOPE LABEL, WATER LABEL, TRAY, DRAWSTRING PAD, AND DEFENDO 4-PIECE KIT.: Brand: ENDO CARRY-ON PROCEDURE KIT

## (undated) DEVICE — SYRINGE MED 20ML STD CLR PLAS LUERLOCK TIP N CTRL DISP

## (undated) DEVICE — BITE BLK ENDOSCP AD 54FR GRN POLYETH ENDOSCP W STRP SLD

## (undated) DEVICE — KIT IV STRT W CHLORAPREP PD 1ML